# Patient Record
Sex: MALE | Race: WHITE | Employment: UNEMPLOYED | URBAN - METROPOLITAN AREA
[De-identification: names, ages, dates, MRNs, and addresses within clinical notes are randomized per-mention and may not be internally consistent; named-entity substitution may affect disease eponyms.]

---

## 2020-09-13 ENCOUNTER — HOSPITAL ENCOUNTER (EMERGENCY)
Age: 35
Discharge: HOME | End: 2020-09-13
Payer: MEDICARE

## 2020-09-13 VITALS
HEIGHT: 62 IN | SYSTOLIC BLOOD PRESSURE: 125 MMHG | BODY MASS INDEX: 22.08 KG/M2 | DIASTOLIC BLOOD PRESSURE: 84 MMHG | HEART RATE: 82 BPM | TEMPERATURE: 97.5 F | RESPIRATION RATE: 16 BRPM | OXYGEN SATURATION: 100 % | WEIGHT: 120 LBS

## 2020-09-13 PROCEDURE — 96372 THER/PROPH/DIAG INJ SC/IM: CPT

## 2020-09-13 PROCEDURE — 99284 EMERGENCY DEPT VISIT MOD MDM: CPT

## 2020-09-13 PROCEDURE — 6360000002 HC RX W HCPCS: Performed by: PHYSICIAN ASSISTANT

## 2020-09-13 PROCEDURE — 99283 EMERGENCY DEPT VISIT LOW MDM: CPT

## 2020-09-13 RX ORDER — TRIAMCINOLONE ACETONIDE 40 MG/ML
40 INJECTION, SUSPENSION INTRA-ARTICULAR; INTRAMUSCULAR ONCE
Status: COMPLETED | OUTPATIENT
Start: 2020-09-13 | End: 2020-09-13

## 2020-09-13 RX ORDER — TRIAMCINOLONE ACETONIDE 5 MG/G
CREAM TOPICAL
Qty: 45 G | Refills: 0 | Status: SHIPPED | OUTPATIENT
Start: 2020-09-13 | End: 2020-09-13 | Stop reason: SDUPTHER

## 2020-09-13 RX ORDER — TRIAMCINOLONE ACETONIDE 5 MG/G
CREAM TOPICAL
Qty: 45 G | Refills: 0 | Status: SHIPPED | OUTPATIENT
Start: 2020-09-13 | End: 2020-09-20

## 2020-09-13 RX ADMIN — TRIAMCINOLONE ACETONIDE 40 MG: 40 INJECTION, SUSPENSION INTRA-ARTICULAR; INTRAMUSCULAR at 19:36

## 2020-09-13 NOTE — ED PROVIDER NOTES
[facility]      Department of Emergency Medicine   ED  Provider Note  Admit Date/RoomTime: 9/13/2020  6:37 PM  ED Room: 04/04  Chief Complaint   Rash (pk legs and left arm )    History of Present Illness   Source of history provided by:  patient. History/Exam Limitations: communication barrier Language and  service used. George Baez is a 30 y.o. old male with has a past medical history of: History reviewed. No pertinent past medical history. presents to the emergency department by private vehicle, for complaint of sudden onset red, raised, itchy and weeping area on lower legs and small patches on volar forearms which began 2 day(s) prior to arrival.  The symptoms were caused by unknown cause. Since onset the symptoms have been persistent. Prior history of similar episodes: No.   His symptoms are associated with very bad itching and relieved by nothing. Patient reports that he did try some over-the-counter Benadryl ointment He denies any difficulty breathing, difficulty swallowing, wheezing, throat tightness, hoarseness, stridor, facial swelling, lip swelling or tongue swelling. Pt reports started after mowing his grass on Friday. ROS    Pertinent positives and negatives are stated within HPI, all other systems reviewed and are negative. History reviewed. No pertinent surgical history. Social History:  reports that he has never smoked. He has never used smokeless tobacco. He reports current alcohol use. He reports that he does not use drugs. Family History: family history is not on file.    Allergies: Demerol hcl [meperidine]    Physical Exam           ED Triage Vitals   BP Temp Temp Source Pulse Resp SpO2 Height Weight   09/13/20 1836 09/13/20 1833 09/13/20 1833 09/13/20 1833 09/13/20 1833 09/13/20 1833 09/13/20 1836 09/13/20 1836   125/84 97.5 °F (36.4 °C) Infrared 82 16 100 % 5' 2\" (1.575 m) 120 lb (54.4 kg)     Oxygen Saturation Interpretation: Normal.    Constitutional:  Alert, development consistent with age. HEENT:  NC/NT. Airway patent. Eyes:  PERRL, EOMI, no discharge. Ears:  TMs without perforation, injection, or bulging. External canals clear without exudate. Mouth:  Mucous membranes moist without lesions, tongue and gums normal.  Throat:  Pharynx without injection, exudate, or tonsillar hypertrophy. Airway patient. Neck:  Supple. No lymphadenopathy. Respiratory:  Clear to auscultation and breath sounds equal.  CV:  Regular rate and rhythm. GI:  Abdomen Soft, nontender, +BS. Integument:  Skin turgor: Normal.              Diffuse patchy vesicular rash, singles and dense clusters noted, blanches, non tender and no erythema or swelling noted. Neurological:  Orientation age-appropriate unless noted elseware. Motor functions intact. Lab / Imaging Results   (All laboratory and radiology results have been personally reviewed by myself)  Labs:  No results found for this visit on 09/13/20. Imaging: All Radiology results interpreted by Radiologist unless otherwise noted. No orders to display       ED Course / Medical Decision Making     Medications   triamcinolone acetonide (KENALOG-40) injection 40 mg (has no administration in time range)        Consults:   None    Procedures:   none    MDM:   Pt presents with itchy vesicular rash on his lower legs. Started after mowing his grass couple days ago. Rash is consistent in appearance with contact dermatitis most likely poison ivy. Kenalog will be administered in department and home with a steroid cream.  Patient is Mongolian-speaking and  service was used. Patient expressed understanding with the plan and the use of medications. Counseling: The emergency provider has spoken with the patient and spouse/SO and discussed todays results, in addition to providing specific details for the plan of care and counseling regarding the diagnosis and prognosis.   Questions are answered at this time and they are agreeable with the plan. Assessment      1. Poison ivy dermatitis Stable     Plan   Discharge to home  Patient condition is good    New Medications     New Prescriptions    TRIAMCINOLONE (ARISTOCORT) 0.5 % CREAM    Apply topically 3 times daily. Do not use past 5 days. Electronically signed by Abbey Rivera PA-C   DD: 9/13/20  **This report was transcribed using voice recognition software. Every effort was made to ensure accuracy; however, inadvertent computerized transcription errors may be present.   END OF ED PROVIDER NOTE       Abbey Rivera PA-C  09/13/20 5528

## 2021-04-26 ENCOUNTER — HOSPITAL ENCOUNTER (EMERGENCY)
Age: 36
Discharge: HOME | End: 2021-04-26
Payer: MEDICARE

## 2021-04-26 VITALS
TEMPERATURE: 97.3 F | BODY MASS INDEX: 22.08 KG/M2 | HEIGHT: 62 IN | HEART RATE: 92 BPM | OXYGEN SATURATION: 100 % | DIASTOLIC BLOOD PRESSURE: 76 MMHG | SYSTOLIC BLOOD PRESSURE: 121 MMHG | WEIGHT: 120 LBS | RESPIRATION RATE: 16 BRPM

## 2021-04-26 DIAGNOSIS — S01.81XA FACIAL LACERATION, INITIAL ENCOUNTER: Primary | ICD-10-CM

## 2021-04-26 PROCEDURE — 99282 EMERGENCY DEPT VISIT SF MDM: CPT

## 2021-04-26 PROCEDURE — 90471 IMMUNIZATION ADMIN: CPT | Performed by: PHYSICIAN ASSISTANT

## 2021-04-26 PROCEDURE — 6360000002 HC RX W HCPCS: Performed by: PHYSICIAN ASSISTANT

## 2021-04-26 PROCEDURE — 90715 TDAP VACCINE 7 YRS/> IM: CPT | Performed by: PHYSICIAN ASSISTANT

## 2021-04-26 PROCEDURE — 12011 RPR F/E/E/N/L/M 2.5 CM/<: CPT

## 2021-04-26 RX ADMIN — TETANUS TOXOID, REDUCED DIPHTHERIA TOXOID AND ACELLULAR PERTUSSIS VACCINE, ADSORBED 0.5 ML: 5; 2.5; 8; 8; 2.5 SUSPENSION INTRAMUSCULAR at 17:58

## 2021-04-26 ASSESSMENT — ENCOUNTER SYMPTOMS
CHEST TIGHTNESS: 0
COLOR CHANGE: 0
COUGH: 0
SHORTNESS OF BREATH: 0

## 2021-04-26 NOTE — ED PROVIDER NOTES
[facility]        Department of Emergency Medicine   ED  Provider Note  Admit Date/RoomTime: 4/26/2021  5:08 PM  ED Room: 29/29  HPI:  4/26/21, Time: 5:26 PM EDT      Patient is a 20-year-old male presenting to the emergency department with a laceration to his forehead. Patient states he is a  and he was working on a car when he was pulling on a tube and the tube came off and hit him in the head causing him to cut his forehead. He states he did not lose consciousness. He states it is not painful. He does not take blood thinners. He does not know of any tetanus in the last 10 years. He denies any headache, dizziness, vision changes, numbness/tingling/weakness. The history is provided by the patient. A  was used. REVIEW OF SYSTEMS:  Review of Systems   Constitutional: Negative for activity change, chills, fatigue and fever. Respiratory: Negative for cough, chest tightness and shortness of breath. Cardiovascular: Negative for chest pain, palpitations and leg swelling. Musculoskeletal: Negative for arthralgias, myalgias, neck pain and neck stiffness. Skin: Positive for wound. Negative for color change, pallor and rash. Neurological: Negative for dizziness, weakness, light-headedness and headaches. Hematological: Does not bruise/bleed easily. Psychiatric/Behavioral: Negative for agitation, behavioral problems and confusion. Pertinent positives and negatives are stated within HPI, all other systems reviewed and are negative.      --------------------------------------------- PAST HISTORY ---------------------------------------------  Past Medical History:  has no past medical history on file. Past Surgical History:  has no past surgical history on file. Social History:  reports that he has never smoked. He has never used smokeless tobacco. He reports current alcohol use. He reports that he does not use drugs.     Family History: family history is not on file. The patients home medications have been reviewed. Allergies: Demerol hcl [meperidine]    -------------------------------------------------- RESULTS -------------------------------------------------  All laboratory and radiology results have been personally reviewed by myself   LABS:  No results found for this visit on 04/26/21. RADIOLOGY:  Interpreted by Radiologist.  No orders to display       ------------------------- NURSING NOTES AND VITALS REVIEWED ---------------------------   The nursing notes within the ED encounter and vital signs as below have been reviewed. /76   Pulse 92   Temp 97.3 °F (36.3 °C)   Resp 16   Ht 5' 2\" (1.575 m)   Wt 120 lb (54.4 kg)   SpO2 100%   BMI 21.95 kg/m²   Oxygen Saturation Interpretation: Normal      ---------------------------------------------------PHYSICAL EXAM--------------------------------------    Physical Exam  Constitutional:       General: He is not in acute distress. Appearance: Normal appearance. He is well-developed. HENT:      Head: Normocephalic and atraumatic. Comments: 1.5 cm Y shaped longitudinal laceration to the midline forehead between the brows. No active bleeding. Mouth/Throat:      Mouth: Mucous membranes are moist.      Pharynx: Oropharynx is clear. Neck:      Musculoskeletal: Normal range of motion and neck supple. No neck rigidity. Cardiovascular:      Rate and Rhythm: Normal rate and regular rhythm. Heart sounds: Normal heart sounds. No murmur. Pulmonary:      Effort: Pulmonary effort is normal. No respiratory distress. Breath sounds: Normal breath sounds. Musculoskeletal: Normal range of motion. General: No swelling, tenderness or deformity. Skin:     General: Skin is warm and dry. Capillary Refill: Capillary refill takes less than 2 seconds. Findings: No erythema. Neurological:      General: No focal deficit present.       Mental Status: He is alert and oriented to person, place, and time. Mental status is at baseline. Coordination: Coordination normal.      Comments: CN III-XII intact. Psychiatric:         Mood and Affect: Mood normal.         Behavior: Behavior normal.         Thought Content: Thought content normal.            ------------------------------ ED COURSE/MEDICAL DECISION MAKING----------------------  Medications   Tetanus-Diphth-Acell Pertussis (BOOSTRIX) injection 0.5 mL (has no administration in time range)         ED COURSE:     No orders to display       Procedures:  Lac Repair    Date/Time: 4/26/2021 5:43 PM  Performed by: Narda Infante PA-C  Authorized by: Narda Infante PA-C     Consent:     Consent obtained:  Verbal    Consent given by:  Patient    Risks discussed:  Infection, pain and poor cosmetic result    Alternatives discussed:  No treatment and referral  Anesthesia (see MAR for exact dosages): Anesthesia method:  Local infiltration    Local anesthetic:  Lidocaine 1% w/o epi  Laceration details:     Location:  Face    Face location:  Forehead    Length (cm):  1  Repair type:     Repair type:  Simple  Pre-procedure details:     Preparation:  Patient was prepped and draped in usual sterile fashion  Exploration:     Hemostasis achieved with:  Direct pressure    Wound exploration: wound explored through full range of motion      Contaminated: no    Treatment:     Area cleansed with:  Hibiclens and saline    Amount of cleaning:  Standard    Irrigation solution:  Sterile saline    Irrigation method:  Syringe    Visualized foreign bodies/material removed: no    Skin repair:     Repair method:  Sutures    Suture size:  6-0    Suture material:  Prolene    Suture technique:  Simple interrupted    Number of sutures:  4  Approximation:     Approximation:  Close  Post-procedure details:     Dressing:  Open (no dressing)    Patient tolerance of procedure:   Tolerated well, no immediate complications         Medical Decision Making: MDM   18-year-old male presenting to the ED with a laceration to his forehead from a tube while working on a car. He has no neurological deficits did not lose consciousness. Patient is updated on his tetanus while in the ED. the laceration was repaired with 4 simple sutures. Patient tolerated without difficulty. The  was used to give the patient proper wound care instructions and he is encouraged to have them removed in 5 days. He is discharged home in good condition. Counseling: The emergency provider has spoken with the patient and discussed todays results, in addition to providing specific details for the plan of care and counseling regarding the diagnosis and prognosis. Questions are answered at this time and they are agreeable with the plan.      --------------------------------- IMPRESSION AND DISPOSITION ---------------------------------    IMPRESSION  1. Facial laceration, initial encounter        DISPOSITION  Disposition: Discharge to home  Patient condition is good      Electronically signed by Ying Arredondo PA-C   DD: 4/26/21  **This report was transcribed using voice recognition software. Every effort was made to ensure accuracy; however, inadvertent computerized transcription errors may be present.   END OF ED PROVIDER NOTE         Ying Arredondo PA-C  04/26/21 3513

## 2021-05-31 ENCOUNTER — APPOINTMENT (OUTPATIENT)
Dept: GENERAL RADIOLOGY | Age: 36
DRG: 313 | End: 2021-05-31
Payer: MEDICARE

## 2021-05-31 ENCOUNTER — APPOINTMENT (OUTPATIENT)
Dept: CT IMAGING | Age: 36
DRG: 313 | End: 2021-05-31
Payer: MEDICARE

## 2021-05-31 ENCOUNTER — HOSPITAL ENCOUNTER (INPATIENT)
Age: 36
LOS: 2 days | Discharge: HOME HEALTH | DRG: 313 | End: 2021-06-02
Attending: EMERGENCY MEDICINE | Admitting: ORTHOPAEDIC SURGERY
Payer: MEDICARE

## 2021-05-31 DIAGNOSIS — G89.18 POST-OP PAIN: ICD-10-CM

## 2021-05-31 DIAGNOSIS — S82.891A CLOSED FRACTURE OF RIGHT ANKLE, INITIAL ENCOUNTER: Primary | ICD-10-CM

## 2021-05-31 DIAGNOSIS — M89.8X6 PAIN OF RIGHT TIBIA: ICD-10-CM

## 2021-05-31 PROBLEM — S82.251A CLOSED DISPLACED COMMINUTED FRACTURE OF SHAFT OF RIGHT TIBIA: Status: ACTIVE | Noted: 2021-05-31

## 2021-05-31 PROCEDURE — 73590 X-RAY EXAM OF LOWER LEG: CPT

## 2021-05-31 PROCEDURE — 73700 CT LOWER EXTREMITY W/O DYE: CPT

## 2021-05-31 PROCEDURE — 96374 THER/PROPH/DIAG INJ IV PUSH: CPT

## 2021-05-31 PROCEDURE — 71045 X-RAY EXAM CHEST 1 VIEW: CPT

## 2021-05-31 PROCEDURE — 6360000002 HC RX W HCPCS: Performed by: EMERGENCY MEDICINE

## 2021-05-31 PROCEDURE — 96376 TX/PRO/DX INJ SAME DRUG ADON: CPT

## 2021-05-31 PROCEDURE — 6360000002 HC RX W HCPCS

## 2021-05-31 PROCEDURE — G0378 HOSPITAL OBSERVATION PER HR: HCPCS

## 2021-05-31 PROCEDURE — 96375 TX/PRO/DX INJ NEW DRUG ADDON: CPT

## 2021-05-31 PROCEDURE — 73610 X-RAY EXAM OF ANKLE: CPT

## 2021-05-31 PROCEDURE — 73600 X-RAY EXAM OF ANKLE: CPT

## 2021-05-31 PROCEDURE — 6360000002 HC RX W HCPCS: Performed by: NURSE PRACTITIONER

## 2021-05-31 PROCEDURE — 2700000000 HC OXYGEN THERAPY PER DAY

## 2021-05-31 PROCEDURE — 99284 EMERGENCY DEPT VISIT MOD MDM: CPT

## 2021-05-31 PROCEDURE — 27810 TREATMENT OF ANKLE FRACTURE: CPT

## 2021-05-31 PROCEDURE — 2500000003 HC RX 250 WO HCPCS

## 2021-05-31 PROCEDURE — 1200000000 HC SEMI PRIVATE

## 2021-05-31 RX ORDER — FENTANYL CITRATE 50 UG/ML
25 INJECTION, SOLUTION INTRAMUSCULAR; INTRAVENOUS ONCE
Status: COMPLETED | OUTPATIENT
Start: 2021-05-31 | End: 2021-05-31

## 2021-05-31 RX ORDER — PROPOFOL 10 MG/ML
INJECTION, EMULSION INTRAVENOUS
Status: COMPLETED
Start: 2021-05-31 | End: 2021-05-31

## 2021-05-31 RX ORDER — KETAMINE HYDROCHLORIDE 10 MG/ML
32.5 INJECTION, SOLUTION INTRAMUSCULAR; INTRAVENOUS ONCE
Status: COMPLETED | OUTPATIENT
Start: 2021-05-31 | End: 2021-05-31

## 2021-05-31 RX ORDER — PROPOFOL 10 MG/ML
32.5 INJECTION, EMULSION INTRAVENOUS ONCE
Status: COMPLETED | OUTPATIENT
Start: 2021-05-31 | End: 2021-05-31

## 2021-05-31 RX ORDER — KETAMINE HYDROCHLORIDE 10 MG/ML
INJECTION, SOLUTION INTRAMUSCULAR; INTRAVENOUS
Status: COMPLETED
Start: 2021-05-31 | End: 2021-05-31

## 2021-05-31 RX ADMIN — FENTANYL CITRATE 25 MCG: 0.05 INJECTION, SOLUTION INTRAMUSCULAR; INTRAVENOUS at 22:47

## 2021-05-31 RX ADMIN — PROPOFOL 33 MG: 10 INJECTION, EMULSION INTRAVENOUS at 22:47

## 2021-05-31 RX ADMIN — KETAMINE HYDROCHLORIDE 32.5 MG: 10 INJECTION, SOLUTION INTRAMUSCULAR; INTRAVENOUS at 22:47

## 2021-05-31 RX ADMIN — FENTANYL CITRATE 25 MCG: 50 INJECTION, SOLUTION INTRAMUSCULAR; INTRAVENOUS at 21:33

## 2021-05-31 ASSESSMENT — ENCOUNTER SYMPTOMS
COUGH: 0
RHINORRHEA: 0
BLOOD IN STOOL: 0
SHORTNESS OF BREATH: 0
DIARRHEA: 0
VOMITING: 0
BACK PAIN: 0
NAUSEA: 0
CONSTIPATION: 0
ABDOMINAL PAIN: 0
SORE THROAT: 0

## 2021-05-31 ASSESSMENT — PAIN SCALES - GENERAL
PAINLEVEL_OUTOF10: 10

## 2021-05-31 ASSESSMENT — PAIN DESCRIPTION - DESCRIPTORS: DESCRIPTORS: ACHING

## 2021-05-31 ASSESSMENT — PAIN DESCRIPTION - PAIN TYPE: TYPE: ACUTE PAIN

## 2021-05-31 ASSESSMENT — PAIN DESCRIPTION - LOCATION: LOCATION: ANKLE

## 2021-06-01 ENCOUNTER — ANESTHESIA (OUTPATIENT)
Dept: OPERATING ROOM | Age: 36
DRG: 313 | End: 2021-06-01
Payer: MEDICARE

## 2021-06-01 ENCOUNTER — ANESTHESIA EVENT (OUTPATIENT)
Dept: OPERATING ROOM | Age: 36
DRG: 313 | End: 2021-06-01
Payer: MEDICARE

## 2021-06-01 ENCOUNTER — APPOINTMENT (OUTPATIENT)
Dept: GENERAL RADIOLOGY | Age: 36
DRG: 313 | End: 2021-06-01
Payer: MEDICARE

## 2021-06-01 VITALS — TEMPERATURE: 97.5 F | SYSTOLIC BLOOD PRESSURE: 161 MMHG | OXYGEN SATURATION: 100 % | DIASTOLIC BLOOD PRESSURE: 96 MMHG

## 2021-06-01 LAB
ABO/RH: NORMAL
ALBUMIN SERPL-MCNC: 4 G/DL (ref 3.5–5.2)
ALBUMIN SERPL-MCNC: 4.1 G/DL (ref 3.5–5.2)
ALP BLD-CCNC: 64 U/L (ref 40–129)
ALP BLD-CCNC: 66 U/L (ref 40–129)
ALT SERPL-CCNC: 13 U/L (ref 0–40)
ALT SERPL-CCNC: 13 U/L (ref 0–40)
ANION GAP SERPL CALCULATED.3IONS-SCNC: 11 MMOL/L (ref 7–16)
ANION GAP SERPL CALCULATED.3IONS-SCNC: 13 MMOL/L (ref 7–16)
ANTIBODY SCREEN: NORMAL
APTT: 24.6 SEC (ref 24.5–35.1)
AST SERPL-CCNC: 29 U/L (ref 0–39)
AST SERPL-CCNC: 31 U/L (ref 0–39)
BASOPHILS ABSOLUTE: 0.05 E9/L (ref 0–0.2)
BASOPHILS ABSOLUTE: 0.06 E9/L (ref 0–0.2)
BASOPHILS RELATIVE PERCENT: 0.4 % (ref 0–2)
BASOPHILS RELATIVE PERCENT: 0.5 % (ref 0–2)
BILIRUB SERPL-MCNC: 0.4 MG/DL (ref 0–1.2)
BILIRUB SERPL-MCNC: 0.5 MG/DL (ref 0–1.2)
BUN BLDV-MCNC: 10 MG/DL (ref 6–20)
BUN BLDV-MCNC: 9 MG/DL (ref 6–20)
CALCIUM SERPL-MCNC: 9.6 MG/DL (ref 8.6–10.2)
CALCIUM SERPL-MCNC: 9.8 MG/DL (ref 8.6–10.2)
CHLORIDE BLD-SCNC: 101 MMOL/L (ref 98–107)
CHLORIDE BLD-SCNC: 98 MMOL/L (ref 98–107)
CO2: 28 MMOL/L (ref 22–29)
CO2: 30 MMOL/L (ref 22–29)
CREAT SERPL-MCNC: 0.6 MG/DL (ref 0.7–1.2)
CREAT SERPL-MCNC: 0.7 MG/DL (ref 0.7–1.2)
EOSINOPHILS ABSOLUTE: 0 E9/L (ref 0.05–0.5)
EOSINOPHILS ABSOLUTE: 0 E9/L (ref 0.05–0.5)
EOSINOPHILS RELATIVE PERCENT: 0 % (ref 0–6)
EOSINOPHILS RELATIVE PERCENT: 0 % (ref 0–6)
GFR AFRICAN AMERICAN: >60
GFR AFRICAN AMERICAN: >60
GFR NON-AFRICAN AMERICAN: >60 ML/MIN/1.73
GFR NON-AFRICAN AMERICAN: >60 ML/MIN/1.73
GLUCOSE BLD-MCNC: 116 MG/DL (ref 74–99)
GLUCOSE BLD-MCNC: 121 MG/DL (ref 74–99)
HCT VFR BLD CALC: 44.8 % (ref 37–54)
HCT VFR BLD CALC: 45 % (ref 37–54)
HEMOGLOBIN: 14.6 G/DL (ref 12.5–16.5)
HEMOGLOBIN: 14.8 G/DL (ref 12.5–16.5)
IMMATURE GRANULOCYTES #: 0.04 E9/L
IMMATURE GRANULOCYTES #: 0.06 E9/L
IMMATURE GRANULOCYTES %: 0.3 % (ref 0–5)
IMMATURE GRANULOCYTES %: 0.5 % (ref 0–5)
INR BLD: 1
LYMPHOCYTES ABSOLUTE: 1.37 E9/L (ref 1.5–4)
LYMPHOCYTES ABSOLUTE: 1.46 E9/L (ref 1.5–4)
LYMPHOCYTES RELATIVE PERCENT: 11.3 % (ref 20–42)
LYMPHOCYTES RELATIVE PERCENT: 12 % (ref 20–42)
MAGNESIUM: 1.6 MG/DL (ref 1.6–2.6)
MCH RBC QN AUTO: 29.9 PG (ref 26–35)
MCH RBC QN AUTO: 30.4 PG (ref 26–35)
MCHC RBC AUTO-ENTMCNC: 32.4 % (ref 32–34.5)
MCHC RBC AUTO-ENTMCNC: 33 % (ref 32–34.5)
MCV RBC AUTO: 92 FL (ref 80–99.9)
MCV RBC AUTO: 92 FL (ref 80–99.9)
MONOCYTES ABSOLUTE: 0.73 E9/L (ref 0.1–0.95)
MONOCYTES ABSOLUTE: 0.77 E9/L (ref 0.1–0.95)
MONOCYTES RELATIVE PERCENT: 6 % (ref 2–12)
MONOCYTES RELATIVE PERCENT: 6.3 % (ref 2–12)
NEUTROPHILS ABSOLUTE: 9.83 E9/L (ref 1.8–7.3)
NEUTROPHILS ABSOLUTE: 9.97 E9/L (ref 1.8–7.3)
NEUTROPHILS RELATIVE PERCENT: 80.8 % (ref 43–80)
NEUTROPHILS RELATIVE PERCENT: 81.9 % (ref 43–80)
PDW BLD-RTO: 13.3 FL (ref 11.5–15)
PDW BLD-RTO: 13.3 FL (ref 11.5–15)
PLATELET # BLD: 263 E9/L (ref 130–450)
PLATELET # BLD: 265 E9/L (ref 130–450)
PMV BLD AUTO: 11.9 FL (ref 7–12)
PMV BLD AUTO: 12.1 FL (ref 7–12)
POTASSIUM REFLEX MAGNESIUM: 3.5 MMOL/L (ref 3.5–5)
POTASSIUM SERPL-SCNC: 3.6 MMOL/L (ref 3.5–5)
PROTHROMBIN TIME: 11.3 SEC (ref 9.3–12.4)
RBC # BLD: 4.87 E12/L (ref 3.8–5.8)
RBC # BLD: 4.89 E12/L (ref 3.8–5.8)
SODIUM BLD-SCNC: 139 MMOL/L (ref 132–146)
SODIUM BLD-SCNC: 142 MMOL/L (ref 132–146)
TOTAL PROTEIN: 7 G/DL (ref 6.4–8.3)
TOTAL PROTEIN: 7.1 G/DL (ref 6.4–8.3)
WBC # BLD: 12.2 E9/L (ref 4.5–11.5)
WBC # BLD: 12.2 E9/L (ref 4.5–11.5)

## 2021-06-01 PROCEDURE — 1200000000 HC SEMI PRIVATE

## 2021-06-01 PROCEDURE — 0QSG04Z REPOSITION RIGHT TIBIA WITH INTERNAL FIXATION DEVICE, OPEN APPROACH: ICD-10-PCS | Performed by: ORTHOPAEDIC SURGERY

## 2021-06-01 PROCEDURE — 3600000005 HC SURGERY LEVEL 5 BASE: Performed by: ORTHOPAEDIC SURGERY

## 2021-06-01 PROCEDURE — 85610 PROTHROMBIN TIME: CPT

## 2021-06-01 PROCEDURE — 6360000002 HC RX W HCPCS: Performed by: STUDENT IN AN ORGANIZED HEALTH CARE EDUCATION/TRAINING PROGRAM

## 2021-06-01 PROCEDURE — 6370000000 HC RX 637 (ALT 250 FOR IP): Performed by: STUDENT IN AN ORGANIZED HEALTH CARE EDUCATION/TRAINING PROGRAM

## 2021-06-01 PROCEDURE — 3700000000 HC ANESTHESIA ATTENDED CARE: Performed by: ORTHOPAEDIC SURGERY

## 2021-06-01 PROCEDURE — 85025 COMPLETE CBC W/AUTO DIFF WBC: CPT

## 2021-06-01 PROCEDURE — 80053 COMPREHEN METABOLIC PANEL: CPT

## 2021-06-01 PROCEDURE — 2580000003 HC RX 258: Performed by: NURSE ANESTHETIST, CERTIFIED REGISTERED

## 2021-06-01 PROCEDURE — 7100000001 HC PACU RECOVERY - ADDTL 15 MIN: Performed by: ORTHOPAEDIC SURGERY

## 2021-06-01 PROCEDURE — 2500000003 HC RX 250 WO HCPCS: Performed by: NURSE ANESTHETIST, CERTIFIED REGISTERED

## 2021-06-01 PROCEDURE — 2700000000 HC OXYGEN THERAPY PER DAY

## 2021-06-01 PROCEDURE — 86901 BLOOD TYPING SEROLOGIC RH(D): CPT

## 2021-06-01 PROCEDURE — 6370000000 HC RX 637 (ALT 250 FOR IP): Performed by: ORTHOPAEDIC SURGERY

## 2021-06-01 PROCEDURE — 0QSG06Z REPOSITION RIGHT TIBIA WITH INTRAMEDULLARY INTERNAL FIXATION DEVICE, OPEN APPROACH: ICD-10-PCS | Performed by: ORTHOPAEDIC SURGERY

## 2021-06-01 PROCEDURE — G0378 HOSPITAL OBSERVATION PER HR: HCPCS

## 2021-06-01 PROCEDURE — 83735 ASSAY OF MAGNESIUM: CPT

## 2021-06-01 PROCEDURE — 2720000010 HC SURG SUPPLY STERILE: Performed by: ORTHOPAEDIC SURGERY

## 2021-06-01 PROCEDURE — 6360000002 HC RX W HCPCS: Performed by: NURSE ANESTHETIST, CERTIFIED REGISTERED

## 2021-06-01 PROCEDURE — 27827 TREAT LOWER LEG FRACTURE: CPT | Performed by: ORTHOPAEDIC SURGERY

## 2021-06-01 PROCEDURE — 2580000003 HC RX 258: Performed by: ORTHOPAEDIC SURGERY

## 2021-06-01 PROCEDURE — 3600000015 HC SURGERY LEVEL 5 ADDTL 15MIN: Performed by: ORTHOPAEDIC SURGERY

## 2021-06-01 PROCEDURE — 73560 X-RAY EXAM OF KNEE 1 OR 2: CPT

## 2021-06-01 PROCEDURE — 85730 THROMBOPLASTIN TIME PARTIAL: CPT

## 2021-06-01 PROCEDURE — 27759 TREATMENT OF TIBIA FRACTURE: CPT | Performed by: ORTHOPAEDIC SURGERY

## 2021-06-01 PROCEDURE — 7100000000 HC PACU RECOVERY - FIRST 15 MIN: Performed by: ORTHOPAEDIC SURGERY

## 2021-06-01 PROCEDURE — 3700000001 HC ADD 15 MINUTES (ANESTHESIA): Performed by: ORTHOPAEDIC SURGERY

## 2021-06-01 PROCEDURE — 73590 X-RAY EXAM OF LOWER LEG: CPT

## 2021-06-01 PROCEDURE — C1713 ANCHOR/SCREW BN/BN,TIS/BN: HCPCS | Performed by: ORTHOPAEDIC SURGERY

## 2021-06-01 PROCEDURE — C1769 GUIDE WIRE: HCPCS | Performed by: ORTHOPAEDIC SURGERY

## 2021-06-01 PROCEDURE — 86850 RBC ANTIBODY SCREEN: CPT

## 2021-06-01 PROCEDURE — 86900 BLOOD TYPING SEROLOGIC ABO: CPT

## 2021-06-01 PROCEDURE — 3209999900 FLUORO FOR SURGICAL PROCEDURES

## 2021-06-01 PROCEDURE — 36415 COLL VENOUS BLD VENIPUNCTURE: CPT

## 2021-06-01 PROCEDURE — 2709999900 HC NON-CHARGEABLE SUPPLY: Performed by: ORTHOPAEDIC SURGERY

## 2021-06-01 PROCEDURE — 2500000003 HC RX 250 WO HCPCS: Performed by: STUDENT IN AN ORGANIZED HEALTH CARE EDUCATION/TRAINING PROGRAM

## 2021-06-01 DEVICE — SCREW BNE L28MM DIA5MM LT GRN TI ST LOK FULL THRD T25: Type: IMPLANTABLE DEVICE | Site: TIBIA | Status: FUNCTIONAL

## 2021-06-01 DEVICE — SCREW LCK 5X32MM W/T25 STRDRV F/IM NAIL STRL: Type: IMPLANTABLE DEVICE | Site: TIBIA | Status: FUNCTIONAL

## 2021-06-01 DEVICE — SCREW BNE L40MM DIA5MM TIB LT GRN TI ST CANN LOK FULL THRD: Type: IMPLANTABLE DEVICE | Site: TIBIA | Status: FUNCTIONAL

## 2021-06-01 DEVICE — IMPLANTABLE DEVICE: Type: IMPLANTABLE DEVICE | Site: TIBIA | Status: FUNCTIONAL

## 2021-06-01 DEVICE — SCREW BNE L38MM DIA5MM TIB LT GRN TI ST CANN LOK FULL THRD: Type: IMPLANTABLE DEVICE | Site: TIBIA | Status: FUNCTIONAL

## 2021-06-01 DEVICE — SCREW BNE L36MM DIA5MM TIB LT GRN TI ST CANN LOK FULL THRD: Type: IMPLANTABLE DEVICE | Site: TIBIA | Status: FUNCTIONAL

## 2021-06-01 DEVICE — SCREW BNE LCK 5X30 MM W/ T25 STARDRV FOR IM NAIL TI STRL: Type: IMPLANTABLE DEVICE | Site: TIBIA | Status: FUNCTIONAL

## 2021-06-01 DEVICE — SCREW BNE L42MM DIA4MM CANC TI SELF DRL ST CANN LOK SHT: Type: IMPLANTABLE DEVICE | Site: TIBIA | Status: FUNCTIONAL

## 2021-06-01 DEVICE — SCREW BNE SD SHT THRD 4X38 MM ST CANN HEX SOCKET TI NS LCP: Type: IMPLANTABLE DEVICE | Site: TIBIA | Status: FUNCTIONAL

## 2021-06-01 RX ORDER — DEXAMETHASONE SODIUM PHOSPHATE 10 MG/ML
INJECTION INTRAMUSCULAR; INTRAVENOUS PRN
Status: DISCONTINUED | OUTPATIENT
Start: 2021-06-01 | End: 2021-06-01 | Stop reason: SDUPTHER

## 2021-06-01 RX ORDER — SODIUM CHLORIDE 0.9 % (FLUSH) 0.9 %
5-40 SYRINGE (ML) INJECTION PRN
Status: DISCONTINUED | OUTPATIENT
Start: 2021-06-01 | End: 2021-06-02 | Stop reason: HOSPADM

## 2021-06-01 RX ORDER — SODIUM CHLORIDE 9 MG/ML
INJECTION, SOLUTION INTRAVENOUS CONTINUOUS
Status: DISCONTINUED | OUTPATIENT
Start: 2021-06-01 | End: 2021-06-02 | Stop reason: HOSPADM

## 2021-06-01 RX ORDER — HYDROMORPHONE HCL 110MG/55ML
PATIENT CONTROLLED ANALGESIA SYRINGE INTRAVENOUS PRN
Status: DISCONTINUED | OUTPATIENT
Start: 2021-06-01 | End: 2021-06-01 | Stop reason: SDUPTHER

## 2021-06-01 RX ORDER — ONDANSETRON 2 MG/ML
INJECTION INTRAMUSCULAR; INTRAVENOUS PRN
Status: DISCONTINUED | OUTPATIENT
Start: 2021-06-01 | End: 2021-06-01 | Stop reason: SDUPTHER

## 2021-06-01 RX ORDER — ACETAMINOPHEN 325 MG/1
650 TABLET ORAL EVERY 4 HOURS PRN
Status: DISCONTINUED | OUTPATIENT
Start: 2021-06-01 | End: 2021-06-02 | Stop reason: HOSPADM

## 2021-06-01 RX ORDER — OXYCODONE HYDROCHLORIDE 5 MG/1
5 TABLET ORAL EVERY 4 HOURS PRN
Status: DISCONTINUED | OUTPATIENT
Start: 2021-06-01 | End: 2021-06-02 | Stop reason: HOSPADM

## 2021-06-01 RX ORDER — MIDAZOLAM HYDROCHLORIDE 1 MG/ML
INJECTION INTRAMUSCULAR; INTRAVENOUS PRN
Status: DISCONTINUED | OUTPATIENT
Start: 2021-06-01 | End: 2021-06-01 | Stop reason: SDUPTHER

## 2021-06-01 RX ORDER — OXYCODONE HYDROCHLORIDE 5 MG/1
5 TABLET ORAL EVERY 6 HOURS PRN
Qty: 28 TABLET | Refills: 0 | Status: SHIPPED | OUTPATIENT
Start: 2021-06-01 | End: 2021-06-08

## 2021-06-01 RX ORDER — PROMETHAZINE HYDROCHLORIDE 25 MG/1
12.5 TABLET ORAL EVERY 6 HOURS PRN
Status: DISCONTINUED | OUTPATIENT
Start: 2021-06-01 | End: 2021-06-02 | Stop reason: HOSPADM

## 2021-06-01 RX ORDER — MORPHINE SULFATE 2 MG/ML
2 INJECTION, SOLUTION INTRAMUSCULAR; INTRAVENOUS
Status: DISCONTINUED | OUTPATIENT
Start: 2021-06-01 | End: 2021-06-02 | Stop reason: HOSPADM

## 2021-06-01 RX ORDER — NEOSTIGMINE METHYLSULFATE 1 MG/ML
INJECTION, SOLUTION INTRAVENOUS PRN
Status: DISCONTINUED | OUTPATIENT
Start: 2021-06-01 | End: 2021-06-01 | Stop reason: SDUPTHER

## 2021-06-01 RX ORDER — ONDANSETRON 2 MG/ML
4 INJECTION INTRAMUSCULAR; INTRAVENOUS EVERY 6 HOURS PRN
Status: DISCONTINUED | OUTPATIENT
Start: 2021-06-01 | End: 2021-06-02 | Stop reason: HOSPADM

## 2021-06-01 RX ORDER — ASPIRIN 81 MG/1
81 TABLET ORAL 2 TIMES DAILY
Status: DISCONTINUED | OUTPATIENT
Start: 2021-06-01 | End: 2021-06-02 | Stop reason: HOSPADM

## 2021-06-01 RX ORDER — FENTANYL CITRATE 50 UG/ML
INJECTION, SOLUTION INTRAMUSCULAR; INTRAVENOUS PRN
Status: DISCONTINUED | OUTPATIENT
Start: 2021-06-01 | End: 2021-06-01 | Stop reason: SDUPTHER

## 2021-06-01 RX ORDER — LIDOCAINE HYDROCHLORIDE 20 MG/ML
INJECTION, SOLUTION INTRAVENOUS PRN
Status: DISCONTINUED | OUTPATIENT
Start: 2021-06-01 | End: 2021-06-01 | Stop reason: SDUPTHER

## 2021-06-01 RX ORDER — PROPOFOL 10 MG/ML
INJECTION, EMULSION INTRAVENOUS PRN
Status: DISCONTINUED | OUTPATIENT
Start: 2021-06-01 | End: 2021-06-01 | Stop reason: SDUPTHER

## 2021-06-01 RX ORDER — SENNA PLUS 8.6 MG/1
1 TABLET ORAL DAILY PRN
Status: DISCONTINUED | OUTPATIENT
Start: 2021-06-01 | End: 2021-06-02 | Stop reason: HOSPADM

## 2021-06-01 RX ORDER — OXYCODONE HYDROCHLORIDE AND ACETAMINOPHEN 5; 325 MG/1; MG/1
1 TABLET ORAL
Status: ACTIVE | OUTPATIENT
Start: 2021-06-01 | End: 2021-06-01

## 2021-06-01 RX ORDER — HYDRALAZINE HYDROCHLORIDE 20 MG/ML
5 INJECTION INTRAMUSCULAR; INTRAVENOUS EVERY 10 MIN PRN
Status: DISCONTINUED | OUTPATIENT
Start: 2021-06-01 | End: 2021-06-02 | Stop reason: HOSPADM

## 2021-06-01 RX ORDER — GLYCOPYRROLATE 1 MG/5 ML
SYRINGE (ML) INTRAVENOUS PRN
Status: DISCONTINUED | OUTPATIENT
Start: 2021-06-01 | End: 2021-06-01 | Stop reason: SDUPTHER

## 2021-06-01 RX ORDER — ASPIRIN 81 MG/1
81 TABLET ORAL 2 TIMES DAILY
Qty: 30 TABLET | Refills: 1 | Status: SHIPPED | OUTPATIENT
Start: 2021-06-01 | End: 2021-07-31

## 2021-06-01 RX ORDER — LABETALOL HYDROCHLORIDE 5 MG/ML
5 INJECTION, SOLUTION INTRAVENOUS EVERY 10 MIN PRN
Status: DISCONTINUED | OUTPATIENT
Start: 2021-06-01 | End: 2021-06-02 | Stop reason: HOSPADM

## 2021-06-01 RX ORDER — POLYETHYLENE GLYCOL 3350 17 G/17G
17 POWDER, FOR SOLUTION ORAL DAILY PRN
Status: DISCONTINUED | OUTPATIENT
Start: 2021-06-01 | End: 2021-06-02 | Stop reason: HOSPADM

## 2021-06-01 RX ORDER — OXYCODONE HYDROCHLORIDE 10 MG/1
10 TABLET ORAL EVERY 4 HOURS PRN
Status: DISCONTINUED | OUTPATIENT
Start: 2021-06-01 | End: 2021-06-02 | Stop reason: HOSPADM

## 2021-06-01 RX ORDER — SODIUM CHLORIDE 0.9 % (FLUSH) 0.9 %
5-40 SYRINGE (ML) INJECTION EVERY 12 HOURS SCHEDULED
Status: DISCONTINUED | OUTPATIENT
Start: 2021-06-01 | End: 2021-06-02 | Stop reason: HOSPADM

## 2021-06-01 RX ORDER — MORPHINE SULFATE 4 MG/ML
4 INJECTION, SOLUTION INTRAMUSCULAR; INTRAVENOUS
Status: DISCONTINUED | OUTPATIENT
Start: 2021-06-01 | End: 2021-06-02 | Stop reason: HOSPADM

## 2021-06-01 RX ORDER — CEFAZOLIN SODIUM 1 G/3ML
INJECTION, POWDER, FOR SOLUTION INTRAMUSCULAR; INTRAVENOUS PRN
Status: DISCONTINUED | OUTPATIENT
Start: 2021-06-01 | End: 2021-06-01 | Stop reason: SDUPTHER

## 2021-06-01 RX ORDER — SODIUM CHLORIDE 9 MG/ML
25 INJECTION, SOLUTION INTRAVENOUS PRN
Status: DISCONTINUED | OUTPATIENT
Start: 2021-06-01 | End: 2021-06-02 | Stop reason: HOSPADM

## 2021-06-01 RX ORDER — PROMETHAZINE HYDROCHLORIDE 25 MG/ML
6.25 INJECTION, SOLUTION INTRAMUSCULAR; INTRAVENOUS
Status: ACTIVE | OUTPATIENT
Start: 2021-06-01 | End: 2021-06-01

## 2021-06-01 RX ORDER — SODIUM CHLORIDE 9 MG/ML
INJECTION, SOLUTION INTRAVENOUS CONTINUOUS PRN
Status: DISCONTINUED | OUTPATIENT
Start: 2021-06-01 | End: 2021-06-01 | Stop reason: SDUPTHER

## 2021-06-01 RX ORDER — ROCURONIUM BROMIDE 10 MG/ML
INJECTION, SOLUTION INTRAVENOUS PRN
Status: DISCONTINUED | OUTPATIENT
Start: 2021-06-01 | End: 2021-06-01 | Stop reason: SDUPTHER

## 2021-06-01 RX ADMIN — MIDAZOLAM 2 MG: 1 INJECTION INTRAMUSCULAR; INTRAVENOUS at 08:26

## 2021-06-01 RX ADMIN — CEFAZOLIN 2000 MG: 10 INJECTION, POWDER, FOR SOLUTION INTRAVENOUS at 16:54

## 2021-06-01 RX ADMIN — ROCURONIUM BROMIDE 50 MG: 10 INJECTION, SOLUTION INTRAVENOUS at 08:30

## 2021-06-01 RX ADMIN — PROPOFOL 200 MG: 10 INJECTION, EMULSION INTRAVENOUS at 08:30

## 2021-06-01 RX ADMIN — HYDROMORPHONE HYDROCHLORIDE 1 MG: 2 INJECTION, SOLUTION INTRAMUSCULAR; INTRAVENOUS; SUBCUTANEOUS at 09:57

## 2021-06-01 RX ADMIN — Medication 3 MG: at 10:13

## 2021-06-01 RX ADMIN — FENTANYL CITRATE 50 MCG: 50 INJECTION, SOLUTION INTRAMUSCULAR; INTRAVENOUS at 09:08

## 2021-06-01 RX ADMIN — OXYCODONE HYDROCHLORIDE 10 MG: 10 TABLET ORAL at 16:49

## 2021-06-01 RX ADMIN — LIDOCAINE HYDROCHLORIDE 100 MG: 20 INJECTION, SOLUTION INTRAVENOUS at 08:30

## 2021-06-01 RX ADMIN — Medication 0.6 MG: at 10:13

## 2021-06-01 RX ADMIN — HYDROMORPHONE HYDROCHLORIDE 1 MG: 2 INJECTION, SOLUTION INTRAMUSCULAR; INTRAVENOUS; SUBCUTANEOUS at 10:24

## 2021-06-01 RX ADMIN — ACETAMINOPHEN 650 MG: 325 TABLET ORAL at 06:38

## 2021-06-01 RX ADMIN — DEXAMETHASONE SODIUM PHOSPHATE 10 MG: 10 INJECTION INTRAMUSCULAR; INTRAVENOUS at 08:44

## 2021-06-01 RX ADMIN — FENTANYL CITRATE 50 MCG: 50 INJECTION, SOLUTION INTRAMUSCULAR; INTRAVENOUS at 08:54

## 2021-06-01 RX ADMIN — SODIUM CHLORIDE: 9 INJECTION, SOLUTION INTRAVENOUS at 03:11

## 2021-06-01 RX ADMIN — ASPIRIN 81 MG: 81 TABLET, COATED ORAL at 16:50

## 2021-06-01 RX ADMIN — ONDANSETRON HYDROCHLORIDE 4 MG: 2 INJECTION, SOLUTION INTRAMUSCULAR; INTRAVENOUS at 10:11

## 2021-06-01 RX ADMIN — SODIUM CHLORIDE: 9 INJECTION, SOLUTION INTRAVENOUS at 07:55

## 2021-06-01 RX ADMIN — CEFAZOLIN 2000 MG: 1 INJECTION, POWDER, FOR SOLUTION INTRAMUSCULAR; INTRAVENOUS at 08:29

## 2021-06-01 RX ADMIN — FENTANYL CITRATE 100 MCG: 50 INJECTION, SOLUTION INTRAMUSCULAR; INTRAVENOUS at 08:30

## 2021-06-01 RX ADMIN — OXYCODONE HYDROCHLORIDE 10 MG: 10 TABLET ORAL at 23:06

## 2021-06-01 RX ADMIN — OXYCODONE HYDROCHLORIDE 10 MG: 10 TABLET ORAL at 06:38

## 2021-06-01 ASSESSMENT — PULMONARY FUNCTION TESTS
PIF_VALUE: 24
PIF_VALUE: 26
PIF_VALUE: 22
PIF_VALUE: 3
PIF_VALUE: 24
PIF_VALUE: 25
PIF_VALUE: 24
PIF_VALUE: 25
PIF_VALUE: 24
PIF_VALUE: 25
PIF_VALUE: 26
PIF_VALUE: 11
PIF_VALUE: 23
PIF_VALUE: 1
PIF_VALUE: 22
PIF_VALUE: 1
PIF_VALUE: 0
PIF_VALUE: 6
PIF_VALUE: 23
PIF_VALUE: 22
PIF_VALUE: 23
PIF_VALUE: 26
PIF_VALUE: 25
PIF_VALUE: 23
PIF_VALUE: 25
PIF_VALUE: 23
PIF_VALUE: 26
PIF_VALUE: 23
PIF_VALUE: 27
PIF_VALUE: 0
PIF_VALUE: 23
PIF_VALUE: 29
PIF_VALUE: 24
PIF_VALUE: 23
PIF_VALUE: 1
PIF_VALUE: 9
PIF_VALUE: 25
PIF_VALUE: 26
PIF_VALUE: 2
PIF_VALUE: 24
PIF_VALUE: 24
PIF_VALUE: 3
PIF_VALUE: 32
PIF_VALUE: 22
PIF_VALUE: 23
PIF_VALUE: 20
PIF_VALUE: 22
PIF_VALUE: 3
PIF_VALUE: 27
PIF_VALUE: 22
PIF_VALUE: 6
PIF_VALUE: 24
PIF_VALUE: 3
PIF_VALUE: 22
PIF_VALUE: 20
PIF_VALUE: 25
PIF_VALUE: 23
PIF_VALUE: 18
PIF_VALUE: 25
PIF_VALUE: 20
PIF_VALUE: 25
PIF_VALUE: 22
PIF_VALUE: 29
PIF_VALUE: 2
PIF_VALUE: 1
PIF_VALUE: 2
PIF_VALUE: 25
PIF_VALUE: 32
PIF_VALUE: 2
PIF_VALUE: 22
PIF_VALUE: 25
PIF_VALUE: 23
PIF_VALUE: 25
PIF_VALUE: 23
PIF_VALUE: 25
PIF_VALUE: 22
PIF_VALUE: 30
PIF_VALUE: 22
PIF_VALUE: 23
PIF_VALUE: 25
PIF_VALUE: 29
PIF_VALUE: 25
PIF_VALUE: 22
PIF_VALUE: 18
PIF_VALUE: 3
PIF_VALUE: 26
PIF_VALUE: 3
PIF_VALUE: 2
PIF_VALUE: 3
PIF_VALUE: 32
PIF_VALUE: 22
PIF_VALUE: 6
PIF_VALUE: 25
PIF_VALUE: 25
PIF_VALUE: 2
PIF_VALUE: 27
PIF_VALUE: 1
PIF_VALUE: 23
PIF_VALUE: 24
PIF_VALUE: 23
PIF_VALUE: 22
PIF_VALUE: 26
PIF_VALUE: 1
PIF_VALUE: 20
PIF_VALUE: 23
PIF_VALUE: 22
PIF_VALUE: 25
PIF_VALUE: 22
PIF_VALUE: 0
PIF_VALUE: 24
PIF_VALUE: 25
PIF_VALUE: 1
PIF_VALUE: 2
PIF_VALUE: 23
PIF_VALUE: 18
PIF_VALUE: 10
PIF_VALUE: 25
PIF_VALUE: 2
PIF_VALUE: 2
PIF_VALUE: 26
PIF_VALUE: 23
PIF_VALUE: 23
PIF_VALUE: 22
PIF_VALUE: 26
PIF_VALUE: 23
PIF_VALUE: 11
PIF_VALUE: 25
PIF_VALUE: 26
PIF_VALUE: 0
PIF_VALUE: 25
PIF_VALUE: 23
PIF_VALUE: 22

## 2021-06-01 ASSESSMENT — PAIN DESCRIPTION - LOCATION
LOCATION: ANKLE
LOCATION: ANKLE

## 2021-06-01 ASSESSMENT — PAIN SCALES - GENERAL
PAINLEVEL_OUTOF10: 3
PAINLEVEL_OUTOF10: 7
PAINLEVEL_OUTOF10: 0
PAINLEVEL_OUTOF10: 7
PAINLEVEL_OUTOF10: 5
PAINLEVEL_OUTOF10: 7
PAINLEVEL_OUTOF10: 2
PAINLEVEL_OUTOF10: 5

## 2021-06-01 ASSESSMENT — PAIN DESCRIPTION - DESCRIPTORS
DESCRIPTORS: ACHING
DESCRIPTORS: ACHING

## 2021-06-01 ASSESSMENT — PAIN DESCRIPTION - PROGRESSION
CLINICAL_PROGRESSION: GRADUALLY IMPROVING
CLINICAL_PROGRESSION: GRADUALLY IMPROVING

## 2021-06-01 ASSESSMENT — PAIN DESCRIPTION - PAIN TYPE
TYPE: ACUTE PAIN;SURGICAL PAIN
TYPE: ACUTE PAIN;SURGICAL PAIN

## 2021-06-01 ASSESSMENT — PAIN DESCRIPTION - ORIENTATION
ORIENTATION: LEFT
ORIENTATION: RIGHT
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
## 2021-06-01 NOTE — PROGRESS NOTES
Occupational Therapy  Date:2021  Patient Name: Vinnie Bull  MRN: [id number]  : 1985  Room: [address] [place] [address]-A     OT order received and appreciated. Chart reviewed. Hold OT evaluation, pt scheduled for surgery  . Will follow.     Dominic Ribeiro OTR/L #[id number]

## 2021-06-01 NOTE — PROGRESS NOTES
Admitted to pacu 7, arouseable on calling, vss. RLE with dsd/ace wrap cd&i.   STAT xray ordered & called

## 2021-06-01 NOTE — ANESTHESIA PRE PROCEDURE
Department of Anesthesiology  Preprocedure Note       Name:  Milton Denise   Age:  29 y.o.  :  1985                                          MRN:  [id number]         Date:  2021      Surgeon: Keysha Oneal):  Rosy Sellers MD    Procedure: Procedure(s):  TIBIA OPEN REDUCTION INTERNAL FIXATION  - RIGHT    Medications prior to admission:   Prior to Admission medications    Not on File       Current medications:    Current Facility-Administered Medications   Medication Dose Route Frequency Provider Last Rate Last Admin    sodium chloride flush 0.9 % injection 5-40 mL  5-40 mL Intravenous 2 times per day Beatriz Dunnings, DO        sodium chloride flush 0.9 % injection 5-40 mL  5-40 mL Intravenous PRN Beatriz Dunnings, DO        0.9 % sodium chloride infusion  25 mL Intravenous PRN Beatriz Dunnings, DO        promethazine (PHENERGAN) tablet 12.5 mg  12.5 mg Oral Q6H PRN Beatriz Dunnings, DO        Or    ondansetron (ZOFRAN) injection 4 mg  4 mg Intravenous Q6H PRN Beatriz Dunnings, DO        polyethylene glycol (GLYCOLAX) packet 17 g  17 g Oral Daily PRN Beatriz Dunnings, DO        0.9 % sodium chloride infusion   Intravenous Continuous Beatriz Dunnings, DO 75 mL/hr at 21 0311 New Bag at 21 0311    acetaminophen (TYLENOL) tablet 650 mg  650 mg Oral Q4H PRN Beatriz Dunnings, DO   650 mg at 21 5781    oxyCODONE (ROXICODONE) immediate release tablet 5 mg  5 mg Oral Q4H PRN Beatriz Dunnings, DO        Or    oxyCODONE HCl (OXY-IR) immediate release tablet 10 mg  10 mg Oral Q4H PRN Beatriz Dunnings, DO   10 mg at 21 4496    morphine (PF) injection 2 mg  2 mg Intravenous Q2H PRN Beatriz Dunnings, DO        Or    morphine sulfate (PF) injection 4 mg  4 mg Intravenous Q2H PRN Beatriz Dunnings, DO        senna (SENOKOT) tablet 8.6 mg  1 tablet Oral Daily PRN Beatriz Dunnings, DO        ceFAZolin (ANCEF) 1,000 mg in sterile water 10 mL IV syringe  1,000 mg Intravenous See Admin Instructions Shiva Coe DO           Allergies: Allergies   Allergen Reactions    Demerol Hcl [Meperidine] Other (See Comments)     Does not rememebr         Problem List:    Patient Active Problem List   Diagnosis Code    Closed displaced comminuted fracture of shaft of right tibia V70.620O       Past Medical History:  History reviewed. No pertinent past medical history. Past Surgical History:  History reviewed. No pertinent surgical history. Social History:    Social History     Tobacco Use    Smoking status: Never Smoker    Smokeless tobacco: Never Used   Substance Use Topics    Alcohol use: Yes     Comment: socially                                Counseling given: Not Answered      Vital Signs (Current):   Vitals:    05/31/21 2247 05/31/21 2252 05/31/21 2332 06/01/21 0108   BP: (!) 144/98 122/84 121/83 118/79   Pulse: 76 82 72 75   Resp: (!) 33 23 20 22   Temp:   98 °F (36.7 °C) 99 °F (37.2 °C)   TempSrc:    Temporal   SpO2: 97% 96% 99% 99%   Weight:       Height:                                                  BP Readings from Last 3 Encounters:   06/01/21 118/79   04/26/21 121/76   09/13/20 125/84       NPO Status:                                                                                 BMI:   Wt Readings from Last 3 Encounters:   05/31/21 111 lb 8.8 oz (50.6 kg)   04/26/21 120 lb (54.4 kg)   09/13/20 120 lb (54.4 kg)     Body mass index is 20.4 kg/m². CBC: No results found for: WBC, RBC, HGB, HCT, MCV, RDW, PLT    CMP: No results found for: NA, K, CL, CO2, BUN, CREATININE, GFRAA, AGRATIO, LABGLOM, GLUCOSE, PROT, CALCIUM, BILITOT, ALKPHOS, AST, ALT    POC Tests: No results for input(s): POCGLU, POCNA, POCK, POCCL, POCBUN, POCHEMO, POCHCT in the last 72 hours.     Coags: No results found for: PROTIME, INR, APTT    HCG (If Applicable): No results found for: PREGTESTUR, PREGSERUM, HCG, HCGQUANT     ABGs: No results found for: PHART, PO2ART, PIX8HMV, VEW0PYL, BEART, M9HTRKVP     Type & Screen (If Applicable):  No results found for: LABABO, LABRH    Drug/Infectious Status (If Applicable):  No results found for: HIV, HEPCAB    COVID-19 Screening (If Applicable): No results found for: COVID19        Anesthesia Evaluation  Patient summary reviewed and Nursing notes reviewed no history of anesthetic complications:   Airway: Mallampati: II  TM distance: >3 FB   Neck ROM: full  Mouth opening: > = 3 FB Dental:          Pulmonary:Negative Pulmonary ROS breath sounds clear to auscultation                             Cardiovascular:Negative CV ROS  Exercise tolerance: good (>4 METS),             Rate: normal                    Neuro/Psych:   Negative Neuro/Psych ROS              GI/Hepatic/Renal: Neg GI/Hepatic/Renal ROS            Endo/Other: Negative Endo/Other ROS                    Abdominal:           Vascular: negative vascular ROS. Anesthesia Plan      general     ASA 1       Induction: intravenous. MIPS: Postoperative opioids intended, Prophylactic antiemetics administered and Postoperative trial extubation. Anesthetic plan and risks discussed with patient.                       Gio Bella MD   6/1/2021

## 2021-06-01 NOTE — PROGRESS NOTES
Physical Therapy    PT orders received and appreciated. Per chart, pt for OR today. Hold PT evaluation for now. Will evaluate post-op when appropriate.      Prosper Pickett, PT, DPT  [id number]

## 2021-06-01 NOTE — PROGRESS NOTES
Dr. Rudy Montez consulted for medical management and stated, [facility] has no medical problems and takes no medications. If something comes up, let attending know to feel free to reconsult\".
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
## 2021-06-01 NOTE — ANESTHESIA POSTPROCEDURE EVALUATION
Department of Anesthesiology  Postprocedure Note    Patient: Nicholas Etienne  MRN: [id number]  YOB: 1985  Date of evaluation: 6/1/2021  Time:  1:58 PM     Procedure Summary     Date: 06/01/21 Room / Location: [place] [address] / [facility]    Anesthesia Start: 6587 Anesthesia Stop: 3173    Procedure: TIBIA OPEN REDUCTION INTERNAL FIXATION  - RIGHT (Right Leg Lower) Diagnosis: (.)    Surgeons: Melly Castellanos MD Responsible Provider: Isiah Abad MD    Anesthesia Type: general ASA Status: 1          Anesthesia Type: No value filed. Bina Phase I: Bina Score: 8    Bina Phase II:      Last vitals: Reviewed and per EMR flowsheets.        Anesthesia Post Evaluation    Patient location during evaluation: PACU  Patient participation: complete - patient participated  Level of consciousness: awake and alert  Airway patency: patent  Nausea & Vomiting: no nausea and no vomiting  Complications: no  Cardiovascular status: hemodynamically stable  Respiratory status: acceptable  Hydration status: euvolemic
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
## 2021-06-02 VITALS
OXYGEN SATURATION: 98 % | BODY MASS INDEX: 20.53 KG/M2 | RESPIRATION RATE: 18 BRPM | HEART RATE: 74 BPM | WEIGHT: 111.55 LBS | TEMPERATURE: 97.5 F | DIASTOLIC BLOOD PRESSURE: 77 MMHG | HEIGHT: 62 IN | SYSTOLIC BLOOD PRESSURE: 137 MMHG

## 2021-06-02 PROCEDURE — G0378 HOSPITAL OBSERVATION PER HR: HCPCS

## 2021-06-02 PROCEDURE — 97530 THERAPEUTIC ACTIVITIES: CPT

## 2021-06-02 PROCEDURE — 99024 POSTOP FOLLOW-UP VISIT: CPT | Performed by: PHYSICIAN ASSISTANT

## 2021-06-02 PROCEDURE — 2580000003 HC RX 258: Performed by: STUDENT IN AN ORGANIZED HEALTH CARE EDUCATION/TRAINING PROGRAM

## 2021-06-02 PROCEDURE — 6370000000 HC RX 637 (ALT 250 FOR IP): Performed by: STUDENT IN AN ORGANIZED HEALTH CARE EDUCATION/TRAINING PROGRAM

## 2021-06-02 PROCEDURE — 6360000002 HC RX W HCPCS: Performed by: STUDENT IN AN ORGANIZED HEALTH CARE EDUCATION/TRAINING PROGRAM

## 2021-06-02 PROCEDURE — 2500000003 HC RX 250 WO HCPCS: Performed by: STUDENT IN AN ORGANIZED HEALTH CARE EDUCATION/TRAINING PROGRAM

## 2021-06-02 PROCEDURE — 97165 OT EVAL LOW COMPLEX 30 MIN: CPT

## 2021-06-02 PROCEDURE — 97161 PT EVAL LOW COMPLEX 20 MIN: CPT

## 2021-06-02 RX ADMIN — OXYCODONE HYDROCHLORIDE 10 MG: 10 TABLET ORAL at 09:46

## 2021-06-02 RX ADMIN — ASPIRIN 81 MG: 81 TABLET, COATED ORAL at 09:46

## 2021-06-02 RX ADMIN — CEFAZOLIN 2000 MG: 10 INJECTION, POWDER, FOR SOLUTION INTRAVENOUS at 01:40

## 2021-06-02 RX ADMIN — Medication 10 ML: at 09:46

## 2021-06-02 ASSESSMENT — PAIN SCALES - GENERAL
PAINLEVEL_OUTOF10: 0
PAINLEVEL_OUTOF10: 7

## 2021-06-02 NOTE — CARE COORDINATION
6/2/2021 social work transition of care planning  Sw brought in  pad, but pt stated that he understands some english. Pt is from home alone and had been independent. Pt did not report having a pcp at this time. Pt use either [facility] or [facility] on [address]. Explained sw role in transition of care planning. Pt plan is home, pt will call for a ride at discharge. Sw will follow.   Electronically signed by RAHEEM Liz on 6/2/2021 at 11:31 AM

## 2021-06-02 NOTE — PROGRESS NOTES
Department of Orthopedic Surgery  Resident Progress Note    POD 1. Patient seen and examined. Pain controlled. No new complaints. Denies chest pain, shortness of breath, dizziness/lightheadedness. VITALS:  /83   Pulse 108   Temp 99 °F (37.2 °C) (Temporal)   Resp 16   Ht 5' 2\" (1.575 m)   Wt 111 lb 8.8 oz (50.6 kg)   SpO2 96%   BMI 20.40 kg/m²     General: in no acute distress and alert    MUSCULOSKELETAL:   right lower extremity:  · Dressing C/D/I  · Compartments soft and compressible  · +PF/DF/EHL  · +2/4 DP & PT pulses, Brisk Cap refill, Toes warm and perfused  · Distal sensation grossly intact to Peroneals, Sural, Saphenous, and tibial nrs    CBC:   Lab Results   Component Value Date    WBC 12.2 06/01/2021    WBC 12.2 06/01/2021    HGB 14.6 06/01/2021    HGB 14.8 06/01/2021    HCT 45.0 06/01/2021    HCT 44.8 06/01/2021     06/01/2021     06/01/2021     PT/INR:    Lab Results   Component Value Date    PROTIME 11.3 06/01/2021    INR 1.0 06/01/2021       ASSESSMENT  S/P open reduction and internal fixation of right distal tibia fracture with intramedullary nailing of right tibial shaft fracture, locked. - 6/1/21    PLAN      · Continue physical therapy and protocol: NWB - R LE  · 24 hour abx coverage - completed  · Deep venous thrombosis prophylaxis - ASA 81 mg BID, early mobilization  · PT/OT  · Pain Control: transition to PO  · Monitor H&H, asymptomatic  · Discharge planning: Home once therapy works with the patient.     Electronically signed by Miller Brito DO on 6/2/2021 at 7:26 AM
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
## 2021-06-02 NOTE — OP NOTE
thigh, but we never used it. The entire right lower extremity was  prepped and draped in usual sterile fashion over a bump. The bump was  used to elevate the right leg in order to avoid obstruction on the  lateral fluoroscopic image. After the prep and drape was completed, we started initially with the  distal ankle fracture. We started with screw fixation of the posterior  malleolus fracture in the joint. We placed a pin from anteromedial to  posterolateral for this, 4.0 cannulated screws and we grabbed the  posterior malleolar piece with a small incision made posteriorly and a  dental pick. We passed the guidewire from anteromedial to  posterolateral across the tibial plafond just above the ankle joint. This was done under fluoroscopic image. We then put a screw. We  drilled the proximal cortex only and put a short threaded 4.0 cannulated  screw through anteromedial to posterolateral capturing the posterior  malleolar piece. Secondly, we went to the medial malleolus fracture  which extended up into the metaphysis, this is a vertical fracture. We  placed a dental pick on this piece through a small incision reducing it  anatomically and then we placed a K-wire for 4.0 cannulated screw from  medial to lateral across the distal tibia just above the previously  placed screw. This was about the level of the physeal scar. The screw  was longer threaded screw cannulated 4.0 which captured the medial  malleolus and brought it back to good position. We then made a surgical incision anteriorly over the distal one-third  shaft fracture of the tibia. We went just lateral to the anterior  tibial compartment. We evacuated the hematoma. With traction on the  leg, we then reduced the fracture anatomically and held it with a small  Kaur clamp in place and reducing the shaft fracture.     We then went proximally and using the suprapatellar approach with an  incision over the quadriceps tendon and incision through the quadriceps  tendon, we placed the Synthes cannulated guide underneath the patella  into the anatomic starting hole on the proximal tibia. This was done  under fluoroscopic image. We then drilled a starter pin into the  proximal tibia and in appropriate angle. Then, we used a reamer to  start to create the proximal tibial starting hole. We then placed a  ball-tip guidewire into the tibia across the proximal tibia, across the  tibial shaft fracture and to the tibial pilon fracture at a level just  next to the more proximal of the two screws. This was about the level  of physeal scar. The patient had a very short tibia, it was measured  about 310 mm long. We reamed starting with a 8.5 reamer and reamed all  the way up to 11.5. We then placed a 10 mm x 300 mm Synthes titanium  nail through the suprapatellar quadriceps tendon underneath the patella  into the proximal tibia, down the tibial shaft, across the fracture of  the tibial shaft which was direct visualization and then to the level of  the physeal scar distally resting on the pilon screws. We removed the ball-tip guidewire. We placed two proximal screws in the  nail, one static and one dynamic from medial to lateral.  We then moved  distally and we placed four screws in the end of the nail, both secured  the shaft fracture and helped secure the pilon fracture. We used a  anteromedial to posterior lateral screw which was very distal in the  nail, which captured the distal pilon piece. Then, we used another  medial to lateral screw which was also in the distal pilon piece  directly medial to lateral.  We then used a direct anterior to posterior  screw which was just at the level of the long oblique fracture and we  placed another screw from medial to lateral proximally which captured  the long posterior medial piece. All screws were in good position. AP  and lateral fluoroscopic image revealed the nail to be in good position.   The pilon fracture will be reduced and the shaft fracture will be  reduced. We never used a tourniquet. We copiously irrigated out this  area. We removed the proximal end of the nail guide which was on the  nail and we closed the incisions with 0 Vicryl suture, 2-0 Vicryl and  3-0 nylon. Sterile dressing was applied. The patient placed in a  short-leg splint. The patient will remain really nonweightbearing at  this time. We may advance him weightbearing once he gets into a walking  boot once his sutures have been removed.         Murtaza Leigh MD    D: 06/01/2021 10:47:43       T: 06/01/2021 10:52:36     [email or web address]  Job#: [id number]     Doc#: [id number]    CC:

## 2021-06-02 NOTE — PROGRESS NOTES
Physical Therapy    Physical Therapy Initial Assessment       Name: Obdulio Pratt  : 1985  MRN: [id number]      Date of Service: 2021    Evaluating PT:  Gaston Goodwin PT, DPT  [id number]    Room #:  7130/4958-W  Diagnosis:  Closed displaced comminuted fracture of shaft of right tibia, initial encounter [S82.303A]   1. Closed right distal tibia pilon fracture intra-articular. 2.  Right tibial shaft fracture with proximal fibula fracture. PMHx/PSHx:  History reviewed. No pertinent past medical history. Procedure/Surgery:  1. Open reduction and internal fixation of right distal tibia fracture. 2.  Intramedullary nailing of right tibial shaft fracture, locked. Precautions:  NWB RLE, Chinese Speaking, Video    Equipment Needs:  [employer]    SUBJECTIVE:    Pt lives with father in a 2 story home with 1 stairs to enter and bilateral rail. Bed is on 2nd floor and bath is on 2nd floor. Pt ambulated with no device independently PTA. Equipment Owned:     OBJECTIVE:   Initial Evaluation  Date: 21 Treatment Short Term/ Long Term   Goals   AM-PAC 6 Clicks 51/     Was pt agreeable to Eval/treatment? Yes     Does pt have pain? Severe pain RLE     Bed Mobility  Rolling: NT  Supine to sit: Robert  Sit to supine: NT  Scooting: Robert  Rolling: Independent  Supine to sit:  Independent  Sit to supine: Independent  Scooting: Independent     Transfers Sit to stand: SBA  Stand to sit: SBA  Stand pivot: SBA [employer]  Sit to stand: Modified Independent  Stand to sit: Modified Independent  Stand pivot: Modified Independent  AAD   Ambulation   50, 50  feet with SBA WW  20 SBA crutches  >150 feet with Modified Independent  AAD   Stair negotiation: ascended and descended  NT- declined after demo, states he prefers to negotiate on buttock  >4 steps with no rail Modified Independent  AAD   ROM BUE:  Defer to OT  BLE:  WFL     Strength   RLE: 2/5 hip and knee  LLE: 5/5  Improve strength > 1 MMT   Balance Sitting EOB: SBA  Dynamic Standing:  SBA [employer]  Sitting EOB:  Independent    Dynamic Standing:  Modified Independent  AAD     Pt is A & O x 4  Sensation:  WNL  Edema:  WNL      Patient education  Pt educated on role of PT    Patient response to education:   Pt verbalized understanding Pt demonstrated skill Pt requires further education in this area   x x x     ASSESSMENT:    Conditions Requiring Skilled Therapeutic Intervention:    [x]Decreased strength     [x]Decreased ROM  [x]Decreased functional mobility  [x]Decreased balance   [x]Decreased endurance   []Decreased posture  []Decreased sensation  []Decreased coordination   []Decreased vision  [x]Decreased safety awareness   [x]Increased pain       Comments:  Pt received in supine agreeable to PT evaluation. Pt required assist of RLE for supine to sit. Pt performing functional transfers and ambulation without assist. Pt demos improved stability using WW in comparison to crutches for ambulation. Pt in agreement. Pt declined to trial stair negotiation after demonstration, stating he prefers to negotiate on buttock. Patient would benefit from continued skilled PT to maximize functional mobility independence. Treatment:  Patient practiced and was instructed in the following treatment:     Bed mobility- verbal cues to facilitate independence   Functional transfers-Verbal cues for proper positioning and sequencing to perform transfers safely with maximum independence.  Gait training-Verbal cues for proper positioning and sequencing using assistive device to maximize functional mobility independence.  Stair negotiation - verbal cues to facilitate independence      Pt's/ family goals   1. Get better    Prognosis is good for reaching above PT goals. Patient and or family understand(s) diagnosis, prognosis, and plan of care.   yes    PHYSICAL THERAPY PLAN OF CARE:    PT POC is established based on physician order and patient diagnosis     Referring provider/PT Order:  Will DO Christy; PT eval and treat Start: 06/01/21 1300, End: 06/01/21 1300, ONE TIME, Standing Count: 1 Occurrences, R    Diagnosis:  Closed displaced comminuted fracture of shaft of right tibia, initial encounter [S82.343E]  Specific instructions for next treatment:  Increase activity tolerance, improve ROM and strength in RLE    Current Treatment Recommendations:     [x] Strengthening to improve independence with functional mobility   [x] ROM to improve independence with functional mobility   [x] Balance Training to improve static/dynamic balance and to reduce fall risk  [x] Endurance Training to improve activity tolerance during functional mobility   [x] Transfer Training to improve safety and independence with all functional transfers   [x] Gait Training to improve gait mechanics, endurance and assess need for appropriate assistive device  [x] Stair Training in preparation for safe discharge home and/or into the community   [x] Positioning to prevent skin breakdown and contractures  [x] Safety and Education Training   [x] Patient/Caregiver Education   [x] HEP  [] Other     PT long term treatment goals are located in above grid    Frequency of treatments: 5-7x/week x 1-2 weeks. Time in  1320  Time out  1353    Total Treatment Time  23 minutes     Evaluation Time includes thorough review of current medical information, gathering information on past medical history/social history and prior level of function, completion of standardized testing/informal observation of tasks, assessment of data and education on plan of care and goals.     CPT codes:  [x] Low Complexity PT evaluation 32596  [] Moderate Complexity PT evaluation 94084  [] High Complexity PT evaluation 86897  [] PT Re-evaluation 09039  [] Gait training 51120 0 minutes  [] Manual therapy 94845 0 minutes  [x] Therapeutic activities 22785 23 minutes  [] Therapeutic exercises 88674 0 minutes  [] Neuromuscular reeducation 46357 0 minutes       Tutu Brady PT,
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
## 2021-06-11 DIAGNOSIS — S82.251A CLOSED DISPLACED COMMINUTED FRACTURE OF SHAFT OF RIGHT TIBIA, INITIAL ENCOUNTER: Primary | ICD-10-CM

## 2021-06-15 ENCOUNTER — OFFICE VISIT (OUTPATIENT)
Dept: ORTHOPEDIC SURGERY | Age: 36
End: 2021-06-15
Payer: MEDICARE

## 2021-06-15 ENCOUNTER — HOSPITAL ENCOUNTER (OUTPATIENT)
Dept: GENERAL RADIOLOGY | Age: 36
Discharge: HOME | End: 2021-06-17
Payer: MEDICARE

## 2021-06-15 VITALS — TEMPERATURE: 98.8 F

## 2021-06-15 DIAGNOSIS — S82.251D CLOSED DISPLACED COMMINUTED FRACTURE OF SHAFT OF RIGHT TIBIA WITH ROUTINE HEALING, SUBSEQUENT ENCOUNTER: Primary | ICD-10-CM

## 2021-06-15 DIAGNOSIS — S82.891D CLOSED FRACTURE OF RIGHT ANKLE WITH ROUTINE HEALING, SUBSEQUENT ENCOUNTER: ICD-10-CM

## 2021-06-15 DIAGNOSIS — S82.251A CLOSED DISPLACED COMMINUTED FRACTURE OF SHAFT OF RIGHT TIBIA, INITIAL ENCOUNTER: ICD-10-CM

## 2021-06-15 PROCEDURE — 29405 APPL SHORT LEG CAST: CPT | Performed by: PHYSICIAN ASSISTANT

## 2021-06-15 PROCEDURE — 73590 X-RAY EXAM OF LOWER LEG: CPT

## 2021-06-15 PROCEDURE — 99212 OFFICE O/P EST SF 10 MIN: CPT | Performed by: PHYSICIAN ASSISTANT

## 2021-06-15 PROCEDURE — 99024 POSTOP FOLLOW-UP VISIT: CPT | Performed by: PHYSICIAN ASSISTANT

## 2021-06-15 RX ORDER — OXYCODONE HYDROCHLORIDE 5 MG/1
5 TABLET ORAL EVERY 4 HOURS PRN
COMMUNITY

## 2021-06-15 NOTE — PROGRESS NOTES
Chief Complaint   Patient presents with    Fracture      Right tibia ORIF        OP:SURGEON: Dr. Sp Velasquez MD  DATE OF PROCEDURE: 6-1-21  PROCEDURE: ORIF right distal tibia, IM nailing of right tibial shaft fracture    POD: 2 weeks    Subjective:  Cristy Boston is following up from the above surgery. He is NWB on right lower extremity. He ambulates with assistive device, crutches. Pain to extremity is mild and is  taking prescribed pain medication, Percocet. They denies numbness or tingling to the right lower extremity. Denies calf pain, chest pain, or shortness of breath. Patient continues to use DVT prophylaxis, ASA 81 mg BID. Patient is not participating in therapy at this time. Review of Systems -    General ROS: negative for - chills, fatigue, fever or night sweats  Respiratory ROS: no cough, shortness of breath, or wheezing  Cardiovascular ROS: no chest pain or dyspnea on exertion  Gastrointestinal ROS: no abdominal pain, nausea, vomiting, diarrhea, constipation,or black or bloody stools  Genitourinary: no hematuria, dysuria, or incontinence   Musculoskeletal ROS: negative for -back or neck pain or stiffness, also see HPI  Neurological ROS: no TIA or stroke symptoms       Objective:    General: Alert and oriented X 3, normocephalic atraumatic, external ears and eye normal, sclera clear, no acute distress, respirations easy and unlabored with no audible wheezes, skin warm and dry, speech and dress appropriate for noted age, affect euthymic. Extremity:  Right Lower Extremity  Skin clean dry and intact, without signs of infection   Incisions well healed. Sutures were removed today and Steri-Strips applied. mild edema noted to the lower leg area  Compartments supple throughout thigh and leg  Calf supple and not tender  negative Homans  Demonstrates active movement with flexion and extension of the right knee.  He wiggles all of his toes with some limitation due to stiffness and swelling. States sensation intact to touch in sural, deep peroneal, superficial peroneal, saphenous, posterior tibial  nerve distributions to foot/ankle. Palpable dorsalis pedis and posterior tibialis pulses, cap refill brisk in toes, foot warm/perfused. Temp 98.8 °F (37.1 °C) (Oral)     XR:   Right tib-fib x-rays today 2 views demonstrate the ORIF right distal tibia fracture with IM nailing of right tibial shaft fracture showing the hardware in stable position and alignment. No evidence of hardware loosening or failure. Assessment:   Diagnosis Orders   1. Closed displaced comminuted fracture of shaft of right tibia with routine healing, subsequent encounter     2. Closed fracture of right ankle with routine healing, subsequent encounter       Plan:  Reviewed x-rays with patient today in office     Keep right lower extremity cast clean dry and intact. Do not remove cast.    Continue nonweightbearing on the right lower extremity in the cast.    We did give him a form to remain off work at least until his follow-up visit. Continue baby aspirin twice daily for DVT prophylaxis. Follow-up in 3 weeks for reevaluation, x-rays and possible transition to a boot. Call if any questions or concerns. Electronically signed by ROZ Sosa on 6/15/2021 at 3:02 PM  Note: This report was completed using computerXillianTV voiced recognition software. Every effort has been made to ensure accuracy; however, inadvertent computerized transcription errors may be present.

## 2021-06-15 NOTE — LETTER
[address]  [address] [facility] [address]  Phone: [phone]  Fax: [phone]        Jennifer 15, 2021     Patient: Elam Essex   YOB: 1985   Date of Visit: 6/15/2021       To Whom It May Concern: It is my medical opinion that Leena Hernández should remain out of work until At least until he is seen for follow-up in our office in 3 weeks. .    If you have any questions or concerns, please don't hesitate to call.     Sincerely,        ROZ Lloyd
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
## 2021-06-15 NOTE — PATIENT INSTRUCTIONS
Keep right lower extremity cast clean dry and intact. Do not remove cast.    Continue nonweightbearing on the right lower extremity in the cast.    Continue baby aspirin twice daily for DVT prophylaxis. Follow-up in 3 weeks for reevaluation, x-rays and possible transition to a boot. Call if any questions or concerns.

## 2021-07-06 ENCOUNTER — HOSPITAL ENCOUNTER (OUTPATIENT)
Dept: GENERAL RADIOLOGY | Age: 36
Discharge: HOME | End: 2021-07-08
Payer: MEDICARE

## 2021-07-06 ENCOUNTER — OFFICE VISIT (OUTPATIENT)
Dept: ORTHOPEDIC SURGERY | Age: 36
End: 2021-07-06
Payer: MEDICARE

## 2021-07-06 VITALS — TEMPERATURE: 98.5 F

## 2021-07-06 DIAGNOSIS — S82.251D CLOSED DISPLACED COMMINUTED FRACTURE OF SHAFT OF RIGHT TIBIA WITH ROUTINE HEALING, SUBSEQUENT ENCOUNTER: Primary | ICD-10-CM

## 2021-07-06 DIAGNOSIS — S82.251D CLOSED DISPLACED COMMINUTED FRACTURE OF SHAFT OF RIGHT TIBIA WITH ROUTINE HEALING, SUBSEQUENT ENCOUNTER: ICD-10-CM

## 2021-07-06 PROCEDURE — 99024 POSTOP FOLLOW-UP VISIT: CPT | Performed by: PHYSICIAN ASSISTANT

## 2021-07-06 PROCEDURE — 73590 X-RAY EXAM OF LOWER LEG: CPT

## 2021-07-06 PROCEDURE — 99213 OFFICE O/P EST LOW 20 MIN: CPT | Performed by: PHYSICIAN ASSISTANT

## 2021-07-06 PROCEDURE — L4350 ANKLE CONTROL ORTHO PRE OTS: HCPCS | Performed by: PHYSICIAN ASSISTANT

## 2021-07-06 NOTE — PATIENT INSTRUCTIONS
Start progressive weightbearing on the on right lower extremity in Boot. Start with 25% of body weight for 7-10 days and if tolerating well, no significant increased pain or swelling ok to progress to 50% of body weight in Boot for 7-10 days. Then to 75% of body weight for 7-10 days, then to 100% on on right lower extremity in the Boot. Once full weight bearing in the Boot for 1-2 weeks and tolerating well OK to start weaning out of Boot. OK to remove boot for hygiene (shower) and exercises. Follow up in 4 weeks for repeat evaluation and x-rays  Call office if you would like an order for physical therapy. Please call the office at [phone] or send [employer] message to providers sooner with any questions or concerns  Strongly recommend all of our patients sign up for [employer] in order to have direct communication VIA [employer] ZAIDA with our clinic staff. Patient Education        Tobillo: Ejercicios  Ankle: Exercises  Instrucciones de cuidado  Aquí se presentan algunos ejemplos de ejercicios para el tobillo. Empiece cada ejercicio lentamente. Reduzca la intensidad del ejercicio si [place] a tener dolor. Brown médico o fisioterapeuta le dirán cuándo puede comenzar con estos ejercicios y cuáles funcionarán mejor para usted. Cómo hacer los ejercicios  Ejercicio del \"alfabeto\"   1. Trace las letras del alfabeto con el dedo ladonna del pie. [place] ayuda a  el tobillo en todas las direcciones. Ejercicio de balanceo de rodilla de lado a lado   1. Siéntese en nany silla con el pie apoyado en el piso. 2. Mueva lentamente la rodilla de un lado a otro, manteniendo el pie apoyado firmemente contra el piso. 3. Continúe haciendo loraine ejercicio alin 2 o 3 minutos. Flexión con toalla   1. Estando sentado, coloque el pie sobre nany toalla en el suelo y acerque la toalla hacia usted con los dedos del pie. 2. Luego, usando [employer] dedos del pie, aleje la toalla de usted.   3. Joey loraine ejercicio más difícil colocando un objeto pesado, abdoulaye nany han de sopa, en el otro extremo de la toalla. Estiramiento con toalla   1. Siéntese con las piernas extendidas y las rodillas rectas. 2. Coloque nany toalla alrededor de funes pie tuan por debajo de los dedos. 3. Sostenga cada extremo de la toalla con cada mano, con las chi por encima de las rodillas. 4. Tire hacia atrás con la toalla para que el pie se estire hacia usted. 5. Mantenga la posición por lo menos de 15 a 30 segundos. 6. Repita de 2 a 4 veces por sesión, hasta 5 sesiones al día. Ejercicio de eversión del tobillo   1. Comience por sentarse con el pie apoyado en el suelo y empujando hacia afuera contra un objeto fijo, abdoulaye la pared o un mueble pesado. Mantenga la posición por alrededor de 6 segundos y luego relájese. Repita de 8 a 12 veces. 2. Después de que se sienta cómodo con esto, pruebe a utilizar un tubo de goma enrollado alrededor de la parte externa de isis pies para tener más resistencia. Empuje el pie hacia afuera contra el tubo y luego cuente hasta 10 a medida que regresa el pie lentamente hacia el centro. Repita de 8 a 12 veces. Ejercicios isométricos de oposición   1. Estando sentado, ponga los pies juntos en el piso. 2. Presione el pie lesionado hacia adentro, contra el otro pie. Mantenga la posición por alrededor de 6 segundos y luego relájese. 3. Luego coloque el talón del otro pie en la parte superior del pie lesionado. Empuje hacia abajo con el talón que está arriba mientras trata de empujar hacia arriba con funes pie lesionado. Mantenga la posición por alrededor de 6 segundos y luego relájese. Repita de 8 a 12 veces. La atención de seguimiento es nany parte clave de funes tratamiento y seguridad. Asegúrese de hacer y acudir a todas las citas, y llame a funes médico si está teniendo problemas. También es anny buena idea saber los resultados de isis exámenes y mantener nany lista de los medicamentos que agustín.   ¿Dónde puede encontrar más información en inglés? Meridee Pickerel a [email or web address] e ingrese a funes cuenta de MyChart. Tran Pillai B292 en el [facility] \"Search Health Information\" para más información (en inglés) sobre \"Tobillo: Ejercicios. \"     Si no tiene nany cuenta, mary carlene en el enlace \"Sign Up Now\". Revisado: 16 noviembre, 2020               Versión del contenido: 12.9  © 9183-1457 Healthwise, Incorporated. Las instrucciones de cuidado fueron adaptadas bajo licencia por [facility] ([facility]). Si usted tiene [place] afección médica o sobre estas instrucciones, siempre pregunte a funes profesional de morgan. [employer] niega toda garantía o responsabilidad por funes uso de esta información.

## 2021-07-12 NOTE — PROGRESS NOTES
Zarina #[id number] used for translation of office visit.
Annalise Jara is a 29 y.o. male who presents for follow up of right tibia ORIF    SURGEON: Dr. Stephen Lyles MD  Date of Injury/Surgery: 6-1-21  Date last seen in office: 6-15-21    Symptoms: better  New complaints: none    Splint removed prior to X Ray. Suture line healed. Weightbearing:  Non-weight bearing      Assistive device Crutches - axillary  Participating in therapy (location if yes)?  no    Refills Needed: None  Order/Referral Needed: no
tibial fracture status post ORIF. The tibial fracture fragments remain in anatomic position and alignment and stable position of the right tibial long intramedullary nail. The distal right tibial fracture line remains visible and with incomplete fracture healing at present. Additional incomplete healing of an oblique, nondisplaced fracture of the proximal right fibula. Assessment:   Diagnosis Orders   1. Closed displaced comminuted fracture of shaft of right tibia with routine healing, subsequent encounter  XR TIBIA FIBULA RIGHT (2 VIEWS)       Plan:  Start progressive weightbearing on the on right lower extremity in Boot. Start with 25% of body weight for 7-10 days and if tolerating well, no significant increased pain or swelling ok to progress to 50% of body weight in Boot for 7-10 days. Then to 75% of body weight for 7-10 days, then to 100% on on right lower extremity in the Boot. Once full weight bearing in the Boot for 1-2 weeks and tolerating well OK to start weaning out of Boot. OK to remove boot for hygiene (shower) and exercises. Follow up in 4 weeks for repeat evaluation and x-rays  Call office if you would like an order for physical therapy. Electronically signed by Michelle Elaine PA-C on 7/11/2021 at 8:38 PM  Note: This report was completed using computerize voiced recognition software. Every effort has been made to ensure accuracy; however, inadvertent computerized transcription errors may be present.

## 2021-07-23 DIAGNOSIS — S82.251D CLOSED DISPLACED COMMINUTED FRACTURE OF SHAFT OF RIGHT TIBIA WITH ROUTINE HEALING, SUBSEQUENT ENCOUNTER: Primary | ICD-10-CM

## 2021-08-03 ENCOUNTER — OFFICE VISIT (OUTPATIENT)
Dept: ORTHOPEDIC SURGERY | Age: 36
End: 2021-08-03
Payer: MEDICARE

## 2021-08-03 ENCOUNTER — HOSPITAL ENCOUNTER (OUTPATIENT)
Dept: GENERAL RADIOLOGY | Age: 36
Discharge: HOME | End: 2021-08-05
Payer: MEDICARE

## 2021-08-03 ENCOUNTER — HOSPITAL ENCOUNTER (OUTPATIENT)
Age: 36
Discharge: HOME | End: 2021-08-05
Payer: MEDICARE

## 2021-08-03 DIAGNOSIS — S82.251D CLOSED DISPLACED COMMINUTED FRACTURE OF SHAFT OF RIGHT TIBIA WITH ROUTINE HEALING, SUBSEQUENT ENCOUNTER: ICD-10-CM

## 2021-08-03 DIAGNOSIS — S82.251D CLOSED DISPLACED COMMINUTED FRACTURE OF SHAFT OF RIGHT TIBIA WITH ROUTINE HEALING, SUBSEQUENT ENCOUNTER: Primary | ICD-10-CM

## 2021-08-03 PROCEDURE — 99024 POSTOP FOLLOW-UP VISIT: CPT | Performed by: PHYSICIAN ASSISTANT

## 2021-08-03 PROCEDURE — 99212 OFFICE O/P EST SF 10 MIN: CPT | Performed by: ORTHOPAEDIC SURGERY

## 2021-08-03 PROCEDURE — 73590 X-RAY EXAM OF LOWER LEG: CPT

## 2021-08-03 NOTE — PROGRESS NOTES
Robyn Browne is a 29 y.o. male who presents for follow up of right LE    SURGEON: Dr. Jimbo Gotti MD  Date of Injury/Surgery: 6/1/2021  Date last seen in office: 7/6/2021    Symptoms: better  New complaints: none    Incision: well healed    Weightbearing: right lower Weight bearing as tolerated      Assistive device No Device  Participating in therapy (location if yes)?  no    Refills Needed: None  Order/Referral Needed: N/A

## 2021-08-03 NOTE — PROGRESS NOTES
Chief Complaint   Patient presents with    Post-Op Check     R Tib ORIF 6/1/2021   Akhil Knutson  # [id number]         OP:SURGEON: Dr. Stephen Lyles MD  DATE OF PROCEDURE: 6-1-21  PROCEDURE: 1. Open reduction and internal fixation of right distal tibia fracture. 2.  Intramedullary nailing of right tibial shaft fracture, locked. POD: 2 months    Subjective:  Annalise Jara is following up from the above surgery. He is WBAT on right lower extremity. He ambulates with no assistive device. Pain to extremity is none and is not taking prescribed pain medication. They denies numbness or tingling to the right lower extremity. Denies calf pain, chest pain, or shortness of breath. Patient has finished DVT prophylaxis. Patient is not participating in therapy at this time. Overall patient states that he is doing well. He has been weightbearing as tolerated on the right lower extremity with no pain or issues. He has weaned out of the boot without any problems. Review of Systems -  All pertinent positives/negative in HPI     Objective:    General: Alert and oriented X 3, normocephalic atraumatic, external ears and eye normal, sclera clear, no acute distress, respirations easy and unlabored with no audible wheezes, skin warm and dry, speech and dress appropriate for noted age, affect euthymic. Extremity:  Right Lower Extremity  Skin clean dry and intact, without signs of infection  Incision well-healed  no edema noted  Compartments supple throughout thigh and leg  Calf supple and not tender  negative Homans  Demonstrates full active motion with knee flexion and extension as well as ankle dorsi/plantar flexion. Ambulates well without the use of any assistive devices. States sensation intact to touch in sural, deep peroneal, superficial peroneal, saphenous, posterior tibial  nerve distributions to foot/ankle.   Palpable dorsalis pedis and posterior tibialis pulses, cap refill brisk in toes, foot warm/perfused. There were no vitals taken for this visit. XR:   2 views right tib-fib demonstrate the ORIF with IM nail showing the hardware in stable position and alignment. No evidence of hardware loosening or failure. Assessment:   Diagnosis Orders   1. Closed displaced comminuted fracture of shaft of right tibia with routine healing, subsequent encounter       Plan:  X-rays reviewed and discussed today with the patient with the . Continue activity as tolerated    Follow up in 3 months for repeat xrays    Please call the office at [phone] or send Briteseed message to providers sooner with any questions or concerns  Strongly recommend all of our patients sign up for Briteseed in order to have direct communication VIA Briteseed ZAIDA with our clinic staff. Electronically signed by ROZ Godinez on 8/3/2021 at 2:31 PM  Note: This report was completed using computer"[employer] voiced recognition software. Every effort has been made to ensure accuracy; however, inadvertent computerized transcription errors may be present.

## 2022-08-19 ENCOUNTER — HOSPITAL ENCOUNTER (EMERGENCY)
Age: 37
Discharge: HOME | End: 2022-08-19
Payer: MEDICARE

## 2022-08-19 ENCOUNTER — APPOINTMENT (OUTPATIENT)
Dept: GENERAL RADIOLOGY | Age: 37
End: 2022-08-19
Payer: MEDICARE

## 2022-08-19 VITALS
TEMPERATURE: 98.6 F | BODY MASS INDEX: 20.24 KG/M2 | DIASTOLIC BLOOD PRESSURE: 92 MMHG | HEART RATE: 65 BPM | OXYGEN SATURATION: 100 % | SYSTOLIC BLOOD PRESSURE: 142 MMHG | RESPIRATION RATE: 14 BRPM | HEIGHT: 62 IN | WEIGHT: 110 LBS

## 2022-08-19 DIAGNOSIS — S83.92XA SPRAIN OF LEFT KNEE, UNSPECIFIED LIGAMENT, INITIAL ENCOUNTER: ICD-10-CM

## 2022-08-19 DIAGNOSIS — M25.562 ACUTE PAIN OF LEFT KNEE: Primary | ICD-10-CM

## 2022-08-19 PROCEDURE — 73562 X-RAY EXAM OF KNEE 3: CPT

## 2022-08-19 PROCEDURE — 99283 EMERGENCY DEPT VISIT LOW MDM: CPT

## 2022-08-19 RX ORDER — IBUPROFEN 600 MG/1
600 TABLET ORAL 3 TIMES DAILY PRN
Qty: 30 TABLET | Refills: 0 | Status: SHIPPED | OUTPATIENT
Start: 2022-08-19

## 2022-08-19 NOTE — Clinical Note
Salo Chavez was seen and treated in our emergency department on 8/19/2022. He may return to work on 08/21/2022. If you have any questions or concerns, please don't hesitate to call.       Sarah Schultz PA-C

## 2022-08-19 NOTE — ED PROVIDER NOTES
smokeless tobacco. He reports current alcohol use. He reports that he does not use drugs. Family History: family history is not on file. The patients home medications have been reviewed. Allergies: Demerol hcl [meperidine]    --------------------------------- RESULTS ------------------------------------------  All laboratory and radiology results have been personally reviewed by myself   LABS:  No results found for this visit on 08/19/22. RADIOLOGY:  Interpreted by Radiologist.  XR KNEE LEFT (3 VIEWS)   Final Result   No acute abnormality of the knee.             ----------------- NURSING NOTES AND VITALS REVIEWED ---------------   The nursing notes within the ED encounter and vital signs as below have been reviewed. BP (!) 142/92   Pulse 65   Temp 98.6 °F (37 °C)   Resp 14   Ht 5' 2\" (1.575 m)   Wt 110 lb (49.9 kg)   SpO2 100%   BMI 20.12 kg/m²   Oxygen Saturation Interpretation: Normal      --------------------------------PHYSICAL EXAM------------------------------------      Constitutional/General: Alert and oriented x3, well appearing, non toxic in NAD  Head: NC/AT  Eyes: PERRL, EOMI  Mouth: Oropharynx clear, handling secretions, no trismus  Neck: Supple, full ROM, no meningeal signs  Pulmonary: Lungs clear to auscultation bilaterally, no wheezes, rales, or rhonchi. Not in respiratory distress  Cardiovascular:  Regular rate and rhythm, no murmurs, gallops, or rubs. 2+ distal pulses  Extremities: Moves all extremities x 4. Exam of left knee without obvious trauma, edema or deformity. Pt is tender over medial joint space. ROM intact but uncomfortable. No gross instability. Negative anterior and posterior drawer signs. Warm and well perfused  Skin: warm and dry without rash  Neurologic: GCS 15,  Intact.   No focal deficits  Psych: Normal Affect      ------------------------ ED COURSE/MEDICAL DECISION MAKING----------------------  Medications - No data to display      Medical Decision Making:    X-rays of left knee are normal.   No acute fx or bony changes seen. Discussed with patient. All history and disposition obtained through  pad. Plan discharge home with Motrin and instructions for rest and ice. F/U Ortho if persistent. Pt aware and agreeable to plan. Counseling: The emergency provider has spoken with the patient and discussed todays results, in addition to providing specific details for the plan of care and counseling regarding the diagnosis and prognosis. Questions are answered at this time and they are agreeable with the plan.      ------------------------ IMPRESSION AND DISPOSITION -------------------------------    IMPRESSION  1. Acute pain of left knee    2.  Sprain of left knee, unspecified ligament, initial encounter        DISPOSITION  Disposition: Discharge to home  Patient condition is stable                    Griselda Perry, PA-C  08/19/22 1300

## 2022-11-14 ENCOUNTER — OFFICE VISIT (OUTPATIENT)
Dept: FAMILY MEDICINE CLINIC | Age: 37
End: 2022-11-14
Payer: MEDICARE

## 2022-11-14 VITALS
BODY MASS INDEX: 21.74 KG/M2 | TEMPERATURE: 98.6 F | WEIGHT: 118.13 LBS | DIASTOLIC BLOOD PRESSURE: 63 MMHG | HEIGHT: 62 IN | OXYGEN SATURATION: 99 % | SYSTOLIC BLOOD PRESSURE: 97 MMHG | HEART RATE: 78 BPM

## 2022-11-14 DIAGNOSIS — M41.9 SCOLIOSIS, UNSPECIFIED SCOLIOSIS TYPE, UNSPECIFIED SPINAL REGION: ICD-10-CM

## 2022-11-14 DIAGNOSIS — M54.50 ACUTE BILATERAL LOW BACK PAIN WITHOUT SCIATICA: ICD-10-CM

## 2022-11-14 DIAGNOSIS — Q87.2 VATER SYNDROME: ICD-10-CM

## 2022-11-14 DIAGNOSIS — N53.12 PAIN WITH EJACULATION: ICD-10-CM

## 2022-11-14 DIAGNOSIS — Q87.2 VATER SYNDROME: Primary | ICD-10-CM

## 2022-11-14 DIAGNOSIS — R35.0 URINARY FREQUENCY: ICD-10-CM

## 2022-11-14 DIAGNOSIS — N53.14 RETROGRADE EJACULATION: ICD-10-CM

## 2022-11-14 LAB
ALBUMIN SERPL-MCNC: 4.3 G/DL (ref 3.5–5.2)
ALP BLD-CCNC: 66 U/L (ref 40–129)
ALT SERPL-CCNC: 9 U/L (ref 0–40)
ANION GAP SERPL CALCULATED.3IONS-SCNC: 11 MMOL/L (ref 7–16)
AST SERPL-CCNC: 19 U/L (ref 0–39)
BASOPHILS ABSOLUTE: 0.09 E9/L (ref 0–0.2)
BASOPHILS RELATIVE PERCENT: 1.5 % (ref 0–2)
BILIRUB SERPL-MCNC: 0.3 MG/DL (ref 0–1.2)
BILIRUBIN URINE: NEGATIVE
BLOOD, URINE: NEGATIVE
BUN BLDV-MCNC: 9 MG/DL (ref 6–20)
CALCIUM SERPL-MCNC: 10.2 MG/DL (ref 8.6–10.2)
CHLORIDE BLD-SCNC: 103 MMOL/L (ref 98–107)
CLARITY: CLEAR
CO2: 28 MMOL/L (ref 22–29)
COLOR: YELLOW
CREAT SERPL-MCNC: 0.7 MG/DL (ref 0.7–1.2)
EOSINOPHILS ABSOLUTE: 0.23 E9/L (ref 0.05–0.5)
EOSINOPHILS RELATIVE PERCENT: 3.8 % (ref 0–6)
GFR SERPL CREATININE-BSD FRML MDRD: >60 ML/MIN/1.73
GLUCOSE BLD-MCNC: 85 MG/DL (ref 74–99)
GLUCOSE URINE: NEGATIVE MG/DL
HCT VFR BLD CALC: 48 % (ref 37–54)
HEMOGLOBIN: 15.5 G/DL (ref 12.5–16.5)
IMMATURE GRANULOCYTES #: 0.01 E9/L
IMMATURE GRANULOCYTES %: 0.2 % (ref 0–5)
KETONES, URINE: NEGATIVE MG/DL
LEUKOCYTE ESTERASE, URINE: NEGATIVE
LYMPHOCYTES ABSOLUTE: 1.62 E9/L (ref 1.5–4)
LYMPHOCYTES RELATIVE PERCENT: 26.6 % (ref 20–42)
MCH RBC QN AUTO: 31.3 PG (ref 26–35)
MCHC RBC AUTO-ENTMCNC: 32.3 % (ref 32–34.5)
MCV RBC AUTO: 96.8 FL (ref 80–99.9)
MONOCYTES ABSOLUTE: 0.32 E9/L (ref 0.1–0.95)
MONOCYTES RELATIVE PERCENT: 5.2 % (ref 2–12)
NEUTROPHILS ABSOLUTE: 3.83 E9/L (ref 1.8–7.3)
NEUTROPHILS RELATIVE PERCENT: 62.7 % (ref 43–80)
NITRITE, URINE: NEGATIVE
PDW BLD-RTO: 13.3 FL (ref 11.5–15)
PH UA: 6.5 (ref 5–9)
PLATELET # BLD: 309 E9/L (ref 130–450)
PMV BLD AUTO: 11.8 FL (ref 7–12)
POTASSIUM SERPL-SCNC: 4.4 MMOL/L (ref 3.5–5)
PROTEIN UA: NEGATIVE MG/DL
RBC # BLD: 4.96 E12/L (ref 3.8–5.8)
SODIUM BLD-SCNC: 142 MMOL/L (ref 132–146)
SPECIFIC GRAVITY UA: 1.01 (ref 1–1.03)
TOTAL PROTEIN: 7.5 G/DL (ref 6.4–8.3)
UROBILINOGEN, URINE: 0.2 E.U./DL
WBC # BLD: 6.1 E9/L (ref 4.5–11.5)

## 2022-11-14 PROCEDURE — G8427 DOCREV CUR MEDS BY ELIG CLIN: HCPCS | Performed by: STUDENT IN AN ORGANIZED HEALTH CARE EDUCATION/TRAINING PROGRAM

## 2022-11-14 PROCEDURE — 4004F PT TOBACCO SCREEN RCVD TLK: CPT | Performed by: STUDENT IN AN ORGANIZED HEALTH CARE EDUCATION/TRAINING PROGRAM

## 2022-11-14 PROCEDURE — 99213 OFFICE O/P EST LOW 20 MIN: CPT | Performed by: STUDENT IN AN ORGANIZED HEALTH CARE EDUCATION/TRAINING PROGRAM

## 2022-11-14 PROCEDURE — G8420 CALC BMI NORM PARAMETERS: HCPCS | Performed by: STUDENT IN AN ORGANIZED HEALTH CARE EDUCATION/TRAINING PROGRAM

## 2022-11-14 PROCEDURE — G8484 FLU IMMUNIZE NO ADMIN: HCPCS | Performed by: STUDENT IN AN ORGANIZED HEALTH CARE EDUCATION/TRAINING PROGRAM

## 2022-11-14 PROCEDURE — 36415 COLL VENOUS BLD VENIPUNCTURE: CPT | Performed by: FAMILY MEDICINE

## 2022-11-14 SDOH — ECONOMIC STABILITY: FOOD INSECURITY: WITHIN THE PAST 12 MONTHS, THE FOOD YOU BOUGHT JUST DIDN'T LAST AND YOU DIDN'T HAVE MONEY TO GET MORE.: NEVER TRUE

## 2022-11-14 SDOH — ECONOMIC STABILITY: FOOD INSECURITY: WITHIN THE PAST 12 MONTHS, YOU WORRIED THAT YOUR FOOD WOULD RUN OUT BEFORE YOU GOT MONEY TO BUY MORE.: NEVER TRUE

## 2022-11-14 ASSESSMENT — PATIENT HEALTH QUESTIONNAIRE - PHQ9
SUM OF ALL RESPONSES TO PHQ9 QUESTIONS 1 & 2: 0
SUM OF ALL RESPONSES TO PHQ QUESTIONS 1-9: 0
2. FEELING DOWN, DEPRESSED OR HOPELESS: 0
SUM OF ALL RESPONSES TO PHQ QUESTIONS 1-9: 0
1. LITTLE INTEREST OR PLEASURE IN DOING THINGS: 0
SUM OF ALL RESPONSES TO PHQ QUESTIONS 1-9: 0
SUM OF ALL RESPONSES TO PHQ QUESTIONS 1-9: 0

## 2022-11-14 ASSESSMENT — SOCIAL DETERMINANTS OF HEALTH (SDOH): HOW HARD IS IT FOR YOU TO PAY FOR THE VERY BASICS LIKE FOOD, HOUSING, MEDICAL CARE, AND HEATING?: NOT HARD AT ALL

## 2022-11-14 NOTE — PROGRESS NOTES
[facility]  Department of [facility]  Family Medicine Residency Program  DATE OF VISIT : 2022      Patient:  Lexx Severino  Age: 40 y.o.       : 1985      Chief complaint:   Chief Complaint   Patient presents with    New Patient         History of Present Illness     Lexx Severino is a 40 y.o. male who presented to the clinic today for establish care. Lower back pain  - 2 weeks of lower back pain. - no trauma or heavy lifting.   - does not radiate, no red flags  - hx of Vater syndrome.    - reports taking OTC homeopathic medications and alleviates some of the symptoms , unsure of ingredients. Urinary frequency/ urgency for the past month  - denies any burning with urination or blood in urine  - denies any discharge from penis    Pain with ejaculation  - reports having pain at times with ejaculation  - denies any semen with ejaculation  - no blood with ejaculation. Medication List:    Current Outpatient Medications   Medication Sig Dispense Refill    ibuprofen (ADVIL;MOTRIN) 600 MG tablet Take 1 tablet by mouth 3 times daily as needed for Pain (Patient not taking: Reported on 2022) 30 tablet 0     No current facility-administered medications for this visit. ROS   Reviewed as above, otherwise negative     Physical Exam   Vitals:   Vitals:    22 0948   BP: 97/63   Pulse: 78   Temp: 98.6 °F (37 °C)   SpO2: 99%       Physical Exam  Vitals reviewed. Constitutional:       Appearance: Normal appearance. HENT:      Head: Normocephalic and atraumatic. Cardiovascular:      Rate and Rhythm: Normal rate and regular rhythm. Pulses: Normal pulses. Heart sounds: Normal heart sounds. Pulmonary:      Effort: Pulmonary effort is normal.      Breath sounds: Normal breath sounds. Abdominal:      General: Bowel sounds are normal.      Palpations: Abdomen is soft.    Musculoskeletal:      Lumbar back: Tenderness present. No bony tenderness. Skin:     Comments: Well healed abdominal scar noted. Neurological:      Mental Status: He is alert. Psychiatric:         Mood and Affect: Mood normal.         Behavior: Behavior normal.         Assessment and Plan     1. VATER syndrome  - given hx of VATER syndrome and new onset urinary issues will order UA, US of kidney and refer to Urology  - does not appear to be a kidney stone or UTI based on presentation  - CBC with Auto Differential; Future  - Comprehensive Metabolic Panel; Future  - US RETROPERITONEAL COMPLETE; Future  - XR LUMBAR SPINE (2-3 VIEWS); Future  - AFL - Theodoro Canavan, MD, Urology, [place]    2. Scoliosis, unspecified scoliosis type, unspecified spinal region  - XR LUMBAR SPINE (2-3 VIEWS); Future    3. Acute bilateral low back pain without sciatica  - see plan above under VATER syndrome  - US RETROPERITONEAL COMPLETE; Future  - XR LUMBAR SPINE (2-3 VIEWS); Future    4. Urinary frequency  - CBC with Auto Differential; Future  - Comprehensive Metabolic Panel; Future  - Urinalysis; Future  - Culture, Urine; Future  - AFL - Theodoro Canavan, MD, Urology, [place]    5. Pain with ejaculation  - PAYTON - Theodoro Canavan, MD, Urology, [place]    6.  Retrograde ejaculation  - PAYTON - Theodoro Canavan, MD, Urology, [facility]      RTO 1 month  Case discussed with Dr. Treasure Mares MD

## 2022-11-14 NOTE — PROGRESS NOTES
S: 40 y.o. male presents today for New Patient      Establish care  Hx of vater syndrome and required surgeries    Hx of low back pain in the last few weeks; no trauma or inciting event  Localized to low back / aching; no red flag symptoms    increased urinary frequency / urgency: 1 month; no other symptoms  Pain with ejaculation    O: VS: BP 97/63   Pulse 78   Temp 98.6 °F (37 °C) (Temporal)   Ht 5' 2\" (1.575 m)   Wt 118 lb 2 oz (53.6 kg)   SpO2 99%   BMI 21.61 kg/m²   AAO/NAD, appropriate affect for mood  CV:  RRR, no murmur  Resp: CTAB  Abdomen: SNTND  Ext: now edema  Msk - paraspinal tenderness lumbar region    Assessment/Plan:   1) Establish care  2) Low back pain - trial of muscle relaxer; xray of lumbar spine  3) urinary urgency / frequency - UA; urine culture; renal US  4) pain with ejaculation - if not able to get in with urology, bring back for prostate exam and consider empiric treatment for prostatitis until he can be seen  5) HM, labs as ordered  RTO: 2-4 weeks    Attending Physician Statement  I have discussed the case, including pertinent history and exam findings with the resident. I agree with the documented assessment and plan.       Electronically signed by Sacha North MD on 11/16/2022 at 5:22 PM

## 2022-11-17 ENCOUNTER — TELEPHONE (OUTPATIENT)
Dept: FAMILY MEDICINE CLINIC | Age: 37
End: 2022-11-17

## 2022-11-17 LAB
ORGANISM: ABNORMAL
URINE CULTURE, ROUTINE: ABNORMAL

## 2022-11-17 RX ORDER — CIPROFLOXACIN 500 MG/1
500 TABLET, FILM COATED ORAL 2 TIMES DAILY
Qty: 14 TABLET | Refills: 0 | Status: SHIPPED | OUTPATIENT
Start: 2022-11-17 | End: 2022-11-24

## 2022-11-17 NOTE — TELEPHONE ENCOUNTER
Tried to call patient 3 times on 3 telephone numbers per chart. No response. No voicemail activated. Will send antibiotics to his pharmacy and try to reach patient again via telephone.     This note is electronically signed by Marek Tate MD on 11/17/2022 at 1:09 PM

## 2022-11-28 ENCOUNTER — HOSPITAL ENCOUNTER (OUTPATIENT)
Age: 37
Discharge: HOME | End: 2022-11-30
Payer: MEDICARE

## 2022-11-28 ENCOUNTER — TELEPHONE (OUTPATIENT)
Dept: FAMILY MEDICINE CLINIC | Age: 37
End: 2022-11-28

## 2022-11-28 ENCOUNTER — HOSPITAL ENCOUNTER (OUTPATIENT)
Dept: GENERAL RADIOLOGY | Age: 37
Discharge: HOME | End: 2022-11-30
Payer: MEDICARE

## 2022-11-28 DIAGNOSIS — M41.9 SCOLIOSIS, UNSPECIFIED SCOLIOSIS TYPE, UNSPECIFIED SPINAL REGION: ICD-10-CM

## 2022-11-28 DIAGNOSIS — S22.080A COMPRESSION FRACTURE OF T12 VERTEBRA, INITIAL ENCOUNTER (HCC): Primary | ICD-10-CM

## 2022-11-28 DIAGNOSIS — M54.50 ACUTE BILATERAL LOW BACK PAIN WITHOUT SCIATICA: ICD-10-CM

## 2022-11-28 DIAGNOSIS — Q87.2 VATER SYNDROME: ICD-10-CM

## 2022-11-28 PROCEDURE — 72100 X-RAY EXAM L-S SPINE 2/3 VWS: CPT

## 2022-11-28 NOTE — TELEPHONE ENCOUNTER
history of VATER syndrome - significant wedge-shaped compression involving the T12 vertebral body.    Will refer to neurosurgery at this time for further assessment

## 2022-12-09 ENCOUNTER — HOSPITAL ENCOUNTER (OUTPATIENT)
Dept: ULTRASOUND IMAGING | Age: 37
End: 2022-12-09
Payer: MEDICARE

## 2022-12-09 DIAGNOSIS — Q87.2 VATER SYNDROME: ICD-10-CM

## 2022-12-09 DIAGNOSIS — M54.50 ACUTE BILATERAL LOW BACK PAIN WITHOUT SCIATICA: ICD-10-CM

## 2022-12-09 PROCEDURE — 76770 US EXAM ABDO BACK WALL COMP: CPT

## 2022-12-12 ENCOUNTER — OFFICE VISIT (OUTPATIENT)
Dept: FAMILY MEDICINE CLINIC | Age: 37
End: 2022-12-12
Payer: MEDICARE

## 2022-12-12 VITALS
HEIGHT: 62 IN | BODY MASS INDEX: 22.08 KG/M2 | WEIGHT: 120 LBS | TEMPERATURE: 97.3 F | DIASTOLIC BLOOD PRESSURE: 74 MMHG | SYSTOLIC BLOOD PRESSURE: 112 MMHG | RESPIRATION RATE: 18 BRPM | HEART RATE: 63 BPM | OXYGEN SATURATION: 98 %

## 2022-12-12 DIAGNOSIS — N52.8 OTHER MALE ERECTILE DYSFUNCTION: ICD-10-CM

## 2022-12-12 DIAGNOSIS — N39.0 URINARY TRACT INFECTION WITHOUT HEMATURIA, SITE UNSPECIFIED: ICD-10-CM

## 2022-12-12 DIAGNOSIS — S22.080A COMPRESSION FRACTURE OF T12 VERTEBRA, INITIAL ENCOUNTER (HCC): Primary | ICD-10-CM

## 2022-12-12 PROCEDURE — G8420 CALC BMI NORM PARAMETERS: HCPCS | Performed by: STUDENT IN AN ORGANIZED HEALTH CARE EDUCATION/TRAINING PROGRAM

## 2022-12-12 PROCEDURE — 4004F PT TOBACCO SCREEN RCVD TLK: CPT | Performed by: STUDENT IN AN ORGANIZED HEALTH CARE EDUCATION/TRAINING PROGRAM

## 2022-12-12 PROCEDURE — 99213 OFFICE O/P EST LOW 20 MIN: CPT | Performed by: STUDENT IN AN ORGANIZED HEALTH CARE EDUCATION/TRAINING PROGRAM

## 2022-12-12 PROCEDURE — 99212 OFFICE O/P EST SF 10 MIN: CPT | Performed by: STUDENT IN AN ORGANIZED HEALTH CARE EDUCATION/TRAINING PROGRAM

## 2022-12-12 PROCEDURE — G8427 DOCREV CUR MEDS BY ELIG CLIN: HCPCS | Performed by: STUDENT IN AN ORGANIZED HEALTH CARE EDUCATION/TRAINING PROGRAM

## 2022-12-12 PROCEDURE — G8484 FLU IMMUNIZE NO ADMIN: HCPCS | Performed by: STUDENT IN AN ORGANIZED HEALTH CARE EDUCATION/TRAINING PROGRAM

## 2022-12-12 RX ORDER — SILDENAFIL 50 MG/1
50 TABLET, FILM COATED ORAL PRN
Qty: 10 TABLET | Refills: 5 | Status: SHIPPED | OUTPATIENT
Start: 2022-12-12

## 2022-12-12 NOTE — PROGRESS NOTES
S: 40 y.o. male here for back pain. Wedge shape compression T12. Ibuprofen helps a bit. Kidney stone    O: VS: /74 (Site: Right Upper Arm, Position: Sitting, Cuff Size: Small Adult)   Pulse 63   Temp 97.3 °F (36.3 °C) (Temporal)   Resp 18   Ht 5' 2\" (1.575 m)   Wt 120 lb (54.4 kg)   SpO2 98%   BMI 21.95 kg/m²    General: NAD, alert and interacting appropriately. CV:  RRR, no gallops, rubs, or murmurs    Resp: CTAB   Abd:  Soft, nontender   Ext:  No edema   Back: scoliosis. Ttp lumbar spinal/paraspinal    Impression: back pain for couple months, compression anomaly. Plan:   MRI LS. Referral to Neurosurg if needed. Ibuprofen  PT  CTM stone, urology referral      Attending Physician Statement  I have discussed the case, including pertinent history and exam findings with the resident. I agree with the documented assessment and plan.

## 2022-12-12 NOTE — PROGRESS NOTES
[facility] [facility]  Department of Family Medicine  Family Medicine Residency Program  DATE OF VISIT : 2022      Patient:  Jc Jarrett  Age: 40 y.o.       : 1985      Chief complaint:   Chief Complaint   Patient presents with    Back Pain         History of Present Illness     Jc Jarrett is a 40 y.o. male who presented to the clinic today for back pain    Chronic lumbar back pain  - extensive work up done at previous visit, including US, Urine culture and X ray. US showed: Nonobstructing 4 mm right renal calculi. Urine culture - Streptococcus mitis/oralis Abnormal. Was prescribed ciprofloxacin. Has completed antibiotic course. X ray of lumbar spine:Acute kyphosis between T12 and L2, secondary to block L1-2 vertebrae and a significant wedge-shaped compression involving the T12 vertebral body. There may be lor vertebra present at T12. Some alleviation with ibuprofen. -  continues to have lower back pain pain. Worse in Am. - movement makes it worse. - denies trauma to lower. UTI  - denies any issues with antibiotics  - denies any urinary symptoms at this time, such as pain or burning with urination    ED  - reports unable to maintain an erection for the past several months  - continues to have nocturnal erections at times  - denies any issues initiating erection. Medication List:    Current Outpatient Medications   Medication Sig Dispense Refill    sildenafil (VIAGRA) 50 MG tablet Take 1 tablet by mouth as needed for Erectile Dysfunction 10 tablet 5    ibuprofen (ADVIL;MOTRIN) 600 MG tablet Take 1 tablet by mouth 3 times daily as needed for Pain (Patient not taking: No sig reported) 30 tablet 0     No current facility-administered medications for this visit.             ROS   Reviewed as above, otherwise negative       Physical Exam   Vitals:   Vitals:    22 1347   BP: 112/74   Pulse: 63   Resp: 18   Temp: 97.3 °F (36.3 °C)   SpO2: 98% Physical Exam  Vitals reviewed. Constitutional:       Appearance: Normal appearance. HENT:      Head: Normocephalic and atraumatic. Cardiovascular:      Rate and Rhythm: Normal rate and regular rhythm. Pulses: Normal pulses. Heart sounds: Normal heart sounds. Pulmonary:      Effort: Pulmonary effort is normal.      Breath sounds: Normal breath sounds. Abdominal:      General: Bowel sounds are normal.      Palpations: Abdomen is soft. Musculoskeletal:      Lumbar back: Tenderness present. No bony tenderness. Comments: Scoliosis noted    Skin:     Comments: Well healed abdominal scar noted. Neurological:      Mental Status: He is alert. Psychiatric:         Mood and Affect: Mood normal.         Behavior: Behavior normal.         Assessment and Plan     1. Compression fracture of T12 vertebra, initial encounter ([facility] [address]  - continue ibuprofen for pain management  - will order MRI for further workup  - MRI LUMBAR SPINE [address]; Future  - [facility], [place], [facility] [address]    2. Urinary tract infection without hematuria, site unspecified  - completed antibiotic course. - repeat Urine culture at this time  - Culture, Urine; Future    3. Other male erectile dysfunction  - recommend to FU with urology. - trail of viagra at this time. - am testosterone. - sildenafil (VIAGRA) 50 MG tablet; Take 1 tablet by mouth as needed for Erectile Dysfunction  Dispense: 10 tablet;  Refill: 5  - Testosterone, free, total; Future      RTO 2 months  Case discussed with Dr. Cecile Osgood, MD

## 2023-01-09 ENCOUNTER — APPOINTMENT (OUTPATIENT)
Dept: ULTRASOUND IMAGING | Age: 38
End: 2023-01-09
Payer: MEDICARE

## 2023-01-09 ENCOUNTER — HOSPITAL ENCOUNTER (EMERGENCY)
Age: 38
Discharge: HOME | End: 2023-01-09
Payer: MEDICARE

## 2023-01-09 ENCOUNTER — APPOINTMENT (OUTPATIENT)
Dept: GENERAL RADIOLOGY | Age: 38
End: 2023-01-09
Payer: MEDICARE

## 2023-01-09 VITALS
HEIGHT: 62 IN | WEIGHT: 120 LBS | RESPIRATION RATE: 16 BRPM | DIASTOLIC BLOOD PRESSURE: 78 MMHG | TEMPERATURE: 97.8 F | HEART RATE: 87 BPM | OXYGEN SATURATION: 100 % | SYSTOLIC BLOOD PRESSURE: 115 MMHG | BODY MASS INDEX: 22.08 KG/M2

## 2023-01-09 DIAGNOSIS — L03.119 CELLULITIS AND ABSCESS OF LEG: Primary | ICD-10-CM

## 2023-01-09 DIAGNOSIS — M25.561 ACUTE PAIN OF RIGHT KNEE: ICD-10-CM

## 2023-01-09 DIAGNOSIS — L02.419 CELLULITIS AND ABSCESS OF LEG: Primary | ICD-10-CM

## 2023-01-09 LAB
ANION GAP SERPL CALCULATED.3IONS-SCNC: 11 MMOL/L (ref 7–16)
BASOPHILS ABSOLUTE: 0.09 E9/L (ref 0–0.2)
BASOPHILS RELATIVE PERCENT: 0.8 % (ref 0–2)
BUN BLDV-MCNC: 9 MG/DL (ref 6–20)
C-REACTIVE PROTEIN: 0.5 MG/DL (ref 0–0.4)
CALCIUM SERPL-MCNC: 9.9 MG/DL (ref 8.6–10.2)
CHLORIDE BLD-SCNC: 99 MMOL/L (ref 98–107)
CO2: 30 MMOL/L (ref 22–29)
CREAT SERPL-MCNC: 0.7 MG/DL (ref 0.7–1.2)
EOSINOPHILS ABSOLUTE: 0.07 E9/L (ref 0.05–0.5)
EOSINOPHILS RELATIVE PERCENT: 0.6 % (ref 0–6)
GFR SERPL CREATININE-BSD FRML MDRD: >60 ML/MIN/1.73
GLUCOSE BLD-MCNC: 107 MG/DL (ref 74–99)
HCT VFR BLD CALC: 43 % (ref 37–54)
HEMOGLOBIN: 14.5 G/DL (ref 12.5–16.5)
IMMATURE GRANULOCYTES #: 0.02 E9/L
IMMATURE GRANULOCYTES %: 0.2 % (ref 0–5)
LYMPHOCYTES ABSOLUTE: 1.87 E9/L (ref 1.5–4)
LYMPHOCYTES RELATIVE PERCENT: 17.1 % (ref 20–42)
MCH RBC QN AUTO: 31.3 PG (ref 26–35)
MCHC RBC AUTO-ENTMCNC: 33.7 % (ref 32–34.5)
MCV RBC AUTO: 92.7 FL (ref 80–99.9)
MONOCYTES ABSOLUTE: 0.53 E9/L (ref 0.1–0.95)
MONOCYTES RELATIVE PERCENT: 4.8 % (ref 2–12)
NEUTROPHILS ABSOLUTE: 8.37 E9/L (ref 1.8–7.3)
NEUTROPHILS RELATIVE PERCENT: 76.5 % (ref 43–80)
PDW BLD-RTO: 13.2 FL (ref 11.5–15)
PLATELET # BLD: 243 E9/L (ref 130–450)
PMV BLD AUTO: 11.4 FL (ref 7–12)
POTASSIUM SERPL-SCNC: 3.8 MMOL/L (ref 3.5–5)
RBC # BLD: 4.64 E12/L (ref 3.8–5.8)
SODIUM BLD-SCNC: 140 MMOL/L (ref 132–146)
WBC # BLD: 11 E9/L (ref 4.5–11.5)

## 2023-01-09 PROCEDURE — 99284 EMERGENCY DEPT VISIT MOD MDM: CPT

## 2023-01-09 PROCEDURE — 85025 COMPLETE CBC W/AUTO DIFF WBC: CPT

## 2023-01-09 PROCEDURE — 93971 EXTREMITY STUDY: CPT

## 2023-01-09 PROCEDURE — 86140 C-REACTIVE PROTEIN: CPT

## 2023-01-09 PROCEDURE — 80048 BASIC METABOLIC PNL TOTAL CA: CPT

## 2023-01-09 PROCEDURE — 6370000000 HC RX 637 (ALT 250 FOR IP): Performed by: NURSE PRACTITIONER

## 2023-01-09 PROCEDURE — 36415 COLL VENOUS BLD VENIPUNCTURE: CPT

## 2023-01-09 PROCEDURE — 73562 X-RAY EXAM OF KNEE 3: CPT

## 2023-01-09 RX ORDER — CEPHALEXIN 500 MG/1
500 CAPSULE ORAL 4 TIMES DAILY
Qty: 28 CAPSULE | Refills: 0 | Status: SHIPPED | OUTPATIENT
Start: 2023-01-09 | End: 2023-01-16

## 2023-01-09 RX ORDER — CEPHALEXIN 500 MG/1
500 CAPSULE ORAL ONCE
Status: COMPLETED | OUTPATIENT
Start: 2023-01-09 | End: 2023-01-09

## 2023-01-09 RX ORDER — SULFAMETHOXAZOLE AND TRIMETHOPRIM 800; 160 MG/1; MG/1
1 TABLET ORAL ONCE
Status: COMPLETED | OUTPATIENT
Start: 2023-01-09 | End: 2023-01-09

## 2023-01-09 RX ORDER — SULFAMETHOXAZOLE AND TRIMETHOPRIM 800; 160 MG/1; MG/1
1 TABLET ORAL 2 TIMES DAILY
Qty: 10 TABLET | Refills: 0 | Status: SHIPPED | OUTPATIENT
Start: 2023-01-09 | End: 2023-01-14

## 2023-01-09 RX ORDER — IBUPROFEN 800 MG/1
800 TABLET ORAL ONCE
Status: COMPLETED | OUTPATIENT
Start: 2023-01-09 | End: 2023-01-09

## 2023-01-09 RX ADMIN — SULFAMETHOXAZOLE AND TRIMETHOPRIM 1 TABLET: 800; 160 TABLET ORAL at 20:47

## 2023-01-09 RX ADMIN — CEPHALEXIN 500 MG: 500 CAPSULE ORAL at 20:47

## 2023-01-09 RX ADMIN — IBUPROFEN 800 MG: 800 TABLET, FILM COATED ORAL at 20:47

## 2023-01-09 ASSESSMENT — PAIN - FUNCTIONAL ASSESSMENT: PAIN_FUNCTIONAL_ASSESSMENT: 0-10

## 2023-01-09 ASSESSMENT — PAIN SCALES - GENERAL: PAINLEVEL_OUTOF10: 8

## 2023-01-09 NOTE — ED PROVIDER NOTES
Independent ZAIDA Visit. [facility]  Department of Emergency Medicine   ED  Encounter Note  Admit Date/RoomTime: 2023  5:06 PM  ED Room:     NAME: Cherry Velasquez  : 1985  MRN: [id number]     Chief Complaint:  Knee Pain (Right knee pain, no known injuries)    History of Present Illness       Cherry Velasquez is a 40 y.o. old male who presents to the emergency department by private vehicle, for non-traumatic right knee pain which started 3-4 days ago. Pain worse with movement. There is a wound to the anterior portion of his knee with associated swelling, redness and warmth localized around the wound/abscess. Patient denies fevers, chills, nausea, vomiting, diarrhea. Patient states at time, pain radiates up to his right thigh. Patient does not recall how the wound/abscess started. ROS   Pertinent positives and negatives are stated within HPI, all other systems reviewed and are negative. Past Medical History:  has a past medical history of VATER syndrome. Surgical History:  has a past surgical history that includes Tibia fracture surgery (Right, 2021); tracheostomy; and Colon surgery. Social History:  reports that he has been smoking cigarettes. He has a 1.00 pack-year smoking history. He has never used smokeless tobacco. He reports current alcohol use. He reports that he does not use drugs. Family History: family history includes Asthma in his mother. Allergies: Demerol hcl [meperidine]    Physical Exam   Oxygen Saturation Interpretation: Normal.        ED Triage Vitals [23 1704]   BP Temp Temp src Heart Rate Resp SpO2 Height Weight   115/78 97.8 °F (36.6 °C) -- 87 16 100 % 5' 2\" (1.575 m) 120 lb (54.4 kg)         Constitutional:  Alert, development consistent with age. Neck:  Normal ROM. Supple. Respiratory:  Regular, non-labored, no tachypnea. Physical Exam  Right Knee: medial, anterior            Tenderness:  moderate. Loreatha Press Swelling/Effusion: Mild. Deformity: no deformity observed/palpated. ROM: full range of motion. Skin:  abscess formation measuring approx. 3cm cm x 3cm cm. No drainage, no fluctuance. Crepitus: Negative. Hip:            Tenderness:  none. Swelling: None. Deformity: no.              ROM: full range of motion. Skin:  no wounds, erythema, or swelling. Joint(s) Above/Below: ankle. Tenderness:  none. Swelling: No.              Deformity: no deformity observed/palpated. ROM: full range of motion. Skin:  no wounds, erythema, or swelling. Neurovascular: Motor deficit: none. Sensory deficit: none. Pulse deficit: none, normal palpable PT/DP pulses. Capillary refill: normal.  Gait:  limp due to affected limb. Lymphatics: No lymphangitis or adenopathy noted. Neurological:  Oriented. Motor functions intact. GCS 15, fluent speech, ambulatory.      Lab / Imaging Results   (All laboratory and radiology results have been personally reviewed by myself)  Labs:  Results for orders placed or performed during the hospital encounter of 44/27/36   Basic Metabolic Panel   Result Value Ref Range    Sodium 140 132 - 146 mmol/L    Potassium 3.8 3.5 - 5.0 mmol/L    Chloride 99 98 - 107 mmol/L    CO2 30 (H) 22 - 29 mmol/L    Anion Gap 11 7 - 16 mmol/L    Glucose 107 (H) 74 - 99 mg/dL    BUN 9 6 - 20 mg/dL    Creatinine 0.7 0.7 - 1.2 mg/dL    Est, Glom Filt Rate >60 >=60 mL/min/1.73    Calcium 9.9 8.6 - 10.2 mg/dL   CBC with Auto Differential   Result Value Ref Range    WBC 11.0 4.5 - 11.5 E9/L    RBC 4.64 3.80 - 5.80 E12/L    Hemoglobin 14.5 12.5 - 16.5 g/dL    Hematocrit 43.0 37.0 - 54.0 %    MCV 92.7 80.0 - 99.9 fL    MCH 31.3 26.0 - 35.0 pg    MCHC 33.7 32.0 - 34.5 %    RDW 13.2 11.5 - 15.0 fL    Platelets 386 933 - 752 E9/L    MPV 11.4 7.0 - 12.0 fL    Neutrophils % 76.5 43.0 - 80.0 %    Immature Granulocytes % 0.2 0.0 - 5.0 %    Lymphocytes % 17.1 (L) 20.0 - 42.0 %    Monocytes % 4.8 2.0 - 12.0 %    Eosinophils % 0.6 0.0 - 6.0 %    Basophils % 0.8 0.0 - 2.0 %    Neutrophils Absolute 8.37 (H) 1.80 - 7.30 E9/L    Immature Granulocytes # 0.02 E9/L    Lymphocytes Absolute 1.87 1.50 - 4.00 E9/L    Monocytes Absolute 0.53 0.10 - 0.95 E9/L    Eosinophils Absolute 0.07 0.05 - 0.50 E9/L    Basophils Absolute 0.09 0.00 - 0.20 E9/L   C-Reactive Protein   Result Value Ref Range    CRP 0.5 (H) 0.0 - 0.4 mg/dL     Imaging: All Radiology results interpreted by Radiologist unless otherwise noted. US DUP LOWER EXTREMITY RIGHT GRACIELA   Final Result   No evidence of DVT in the right lower extremity. XR KNEE RIGHT (3 VIEWS)   Final Result   No acute fracture or dislocation. ED Course / Medical Decision Making     Medications   ibuprofen (ADVIL;MOTRIN) tablet 800 mg (has no administration in time range)   cephALEXin (KEFLEX) capsule 500 mg (has no administration in time range)   sulfamethoxazole-trimethoprim (BACTRIM DS;SEPTRA DS) 800-160 MG per tablet 1 tablet (has no administration in time range)        Consult(s):   None    Procedure(s):   none. MDM:     Patient presented to the ER with nontraumatic right knee pain that started 3 to 4 days ago. Pain was worse with movement. There was a small abscess to the anterior portion of his knee. Patient is not sure how it developed. Denies fevers or chills. Differential diagnosis: Septic joint, abscess, cellulitis, orthopedic injury  X-rays of his right knee showed no acute fracture or dislocation. No joint effusion was appreciated on the x-ray either. Ultrasound of the right lower extremity showed no acute evidence of a DVT in the right lower leg. BMP, CBC and a CRP were performed. No white count elevation in the CBC. BMP was within normal limits. CRP was resulted at 0.5.   Due to these findings, I had a low suspicion of a septic joint within the right knee. However, I we will start the patient on p.o. antibiotics. He received his first dose here of 500 mg of Keflex, Bactrim DS and 800 mg ibuprofen p.o. Prescriptions were sent to the patient's pharmacy for Keflex 4 times daily for 7 days and Bactrim twice daily for 5 days. Advised patient to follow-up with his PCP this week and to return to the ER for any worsening symptoms including worsening pain, swelling, warmth, redness, difficulty ambulating or fevers. Plan of Care/Counseling:  LEYDI Tran CNP reviewed today's visit with the patient in addition to providing specific details for the plan of care and counseling regarding the diagnosis and prognosis. Questions are answered at this time and are agreeable with the plan. Assessment      1. Cellulitis and abscess of leg    2. Acute pain of right knee      Plan   Discharged home. Patient condition is stable    New Medications     New Prescriptions    CEPHALEXIN (KEFLEX) 500 MG CAPSULE    Take 1 capsule by mouth 4 times daily for 7 days    SULFAMETHOXAZOLE-TRIMETHOPRIM (BACTRIM DS) 800-160 MG PER TABLET    Take 1 tablet by mouth 2 times daily for 5 days     Electronically signed by LEYDI Tran CNP   DD: 1/9/23  **This report was transcribed using voice recognition software. Every effort was made to ensure accuracy; however, inadvertent computerized transcription errors may be present.   END OF ED PROVIDER NOTE       LEYDI Tran CNP  01/09/23 2040

## 2023-01-09 NOTE — LETTER
[facility] Emergency Department  [address] [place] [address]  Phone: [phone]               January 9, 2023    Patient: Lexx Severino   YOB: 1985   Date of Visit: 1/9/2023       To Whom It May Concern:    Lexx Severino was seen and treated in our emergency department on 1/9/2023. He may return to work 1/11/23.       Sincerely,       Percy Smalls RN         Signature:__________________________________

## 2023-01-10 NOTE — DISCHARGE INSTRUCTIONS
Take the antibiotics until they are both completed. Apply warm compresses to the abscess to draw the infection to the surface. Let it drain. Return to the ER if the redness gets worse, worsening knee swelling, fevers. Follow-up with your primary care doctor this week.

## 2023-05-08 ENCOUNTER — HOSPITAL ENCOUNTER (EMERGENCY)
Age: 38
Discharge: HOME | End: 2023-05-08
Attending: EMERGENCY MEDICINE
Payer: MEDICAID

## 2023-05-08 VITALS
RESPIRATION RATE: 16 BRPM | OXYGEN SATURATION: 99 % | HEART RATE: 72 BPM | BODY MASS INDEX: 21.95 KG/M2 | SYSTOLIC BLOOD PRESSURE: 119 MMHG | DIASTOLIC BLOOD PRESSURE: 70 MMHG | WEIGHT: 120 LBS | TEMPERATURE: 98.2 F

## 2023-05-08 DIAGNOSIS — L03.119 CELLULITIS AND ABSCESS OF LEG: Primary | ICD-10-CM

## 2023-05-08 DIAGNOSIS — L02.419 CELLULITIS AND ABSCESS OF LEG: Primary | ICD-10-CM

## 2023-05-08 PROCEDURE — 6370000000 HC RX 637 (ALT 250 FOR IP): Performed by: EMERGENCY MEDICINE

## 2023-05-08 PROCEDURE — 99284 EMERGENCY DEPT VISIT MOD MDM: CPT

## 2023-05-08 PROCEDURE — 90714 TD VACC NO PRESV 7 YRS+ IM: CPT | Performed by: EMERGENCY MEDICINE

## 2023-05-08 PROCEDURE — 90471 IMMUNIZATION ADMIN: CPT | Performed by: EMERGENCY MEDICINE

## 2023-05-08 PROCEDURE — 6360000002 HC RX W HCPCS: Performed by: EMERGENCY MEDICINE

## 2023-05-08 RX ORDER — HYDROCODONE BITARTRATE AND ACETAMINOPHEN 5; 325 MG/1; MG/1
1 TABLET ORAL ONCE
Status: COMPLETED | OUTPATIENT
Start: 2023-05-08 | End: 2023-05-08

## 2023-05-08 RX ORDER — HYDROCODONE BITARTRATE AND ACETAMINOPHEN 5; 325 MG/1; MG/1
1 TABLET ORAL EVERY 6 HOURS PRN
Qty: 6 TABLET | Refills: 0 | Status: SHIPPED | OUTPATIENT
Start: 2023-05-08 | End: 2023-05-11

## 2023-05-08 RX ORDER — TETANUS AND DIPHTHERIA TOXOIDS ADSORBED 2; 2 [LF]/.5ML; [LF]/.5ML
0.5 INJECTION INTRAMUSCULAR ONCE
Status: COMPLETED | OUTPATIENT
Start: 2023-05-08 | End: 2023-05-08

## 2023-05-08 RX ORDER — BACITRACIN, NEOMYCIN, POLYMYXIN B 400; 3.5; 5 [USP'U]/G; MG/G; [USP'U]/G
OINTMENT TOPICAL
Qty: 30 G | Refills: 0 | Status: SHIPPED | OUTPATIENT
Start: 2023-05-08 | End: 2023-05-18

## 2023-05-08 RX ORDER — SULFAMETHOXAZOLE AND TRIMETHOPRIM 800; 160 MG/1; MG/1
1 TABLET ORAL 2 TIMES DAILY
Qty: 20 TABLET | Refills: 0 | Status: SHIPPED | OUTPATIENT
Start: 2023-05-08 | End: 2023-05-18

## 2023-05-08 RX ORDER — IBUPROFEN 600 MG/1
600 TABLET ORAL EVERY 8 HOURS PRN
Qty: 15 TABLET | Refills: 0 | Status: SHIPPED | OUTPATIENT
Start: 2023-05-08 | End: 2023-05-13

## 2023-05-08 RX ORDER — SULFAMETHOXAZOLE AND TRIMETHOPRIM 800; 160 MG/1; MG/1
1 TABLET ORAL ONCE
Status: COMPLETED | OUTPATIENT
Start: 2023-05-08 | End: 2023-05-08

## 2023-05-08 RX ADMIN — SULFAMETHOXAZOLE AND TRIMETHOPRIM 1 TABLET: 800; 160 TABLET ORAL at 20:46

## 2023-05-08 RX ADMIN — TETANUS AND DIPHTHERIA TOXOIDS ADSORBED 0.5 ML: 2; 2 INJECTION INTRAMUSCULAR at 20:56

## 2023-05-08 RX ADMIN — HYDROCODONE BITARTRATE AND ACETAMINOPHEN 1 TABLET: 5; 325 TABLET ORAL at 20:46

## 2023-05-08 ASSESSMENT — PAIN - FUNCTIONAL ASSESSMENT
PAIN_FUNCTIONAL_ASSESSMENT: 0-10
PAIN_FUNCTIONAL_ASSESSMENT: NONE - DENIES PAIN

## 2023-05-08 ASSESSMENT — LIFESTYLE VARIABLES
HOW OFTEN DO YOU HAVE A DRINK CONTAINING ALCOHOL: MONTHLY OR LESS
HOW MANY STANDARD DRINKS CONTAINING ALCOHOL DO YOU HAVE ON A TYPICAL DAY: 1 OR 2

## 2023-05-08 ASSESSMENT — PAIN DESCRIPTION - PAIN TYPE: TYPE: ACUTE PAIN

## 2023-05-08 ASSESSMENT — PAIN DESCRIPTION - DESCRIPTORS
DESCRIPTORS: ACHING
DESCRIPTORS: ACHING;DISCOMFORT;SORE

## 2023-05-08 ASSESSMENT — PAIN SCALES - GENERAL
PAINLEVEL_OUTOF10: 5
PAINLEVEL_OUTOF10: 3

## 2023-05-08 ASSESSMENT — PAIN DESCRIPTION - LOCATION
LOCATION: LEG
LOCATION: LEG

## 2023-05-08 ASSESSMENT — PAIN DESCRIPTION - ORIENTATION
ORIENTATION: RIGHT;UPPER
ORIENTATION: RIGHT

## 2023-05-08 ASSESSMENT — PAIN DESCRIPTION - FREQUENCY: FREQUENCY: CONTINUOUS

## 2023-05-08 ASSESSMENT — PAIN DESCRIPTION - ONSET: ONSET: ON-GOING

## 2023-05-08 NOTE — ED PROVIDER NOTES
HPI:  5/8/23, Time: 7:44 PM EDT        Vivi Frazier is a 40 y.o. male presenting to the ED for abscess and drainage from the right thigh with redness, beginning 4-5 days ago. The complaint has been persistent, moderate in severity, and worsened by nothing. The tablet  system was utilized for communication with the patient ([employer]). Patient's not had any fever/chills, no history of any other complaints at all associated. States that the redness of the wound has progressed over the past 2 days and that the wound spontaneously drained purulent material starting 2 days ago. No relieving factors are reported. No calf pain or any other areas of pain reported. Review of Systems:   A complete review of systems was performed and pertinent positives and negatives are stated within HPI, all other systems reviewed and are negative.    --------------------------------------------- PAST HISTORY ---------------------------------------------  Past Medical History:  has a past medical history of VATER syndrome. Past Surgical History:  has a past surgical history that includes Tibia fracture surgery (Right, 06/01/2021); tracheostomy; and Colon surgery. Social History:  reports that he has been smoking cigarettes. He has a 1.00 pack-year smoking history. He has never used smokeless tobacco. He reports current alcohol use. He reports that he does not use drugs. Family History: family history includes Asthma in his mother. The patients home medications have been reviewed. Allergies: Demerol hcl [meperidine]    -------------------------------------------------- RESULTS -------------------------------------------------  All laboratory and radiology results have been personally reviewed by asaelelf   LABS:  No results found for this visit on 05/08/23.     RADIOLOGY:  Interpreted by Radiologist.  No orders to display       ------------------------- NURSING NOTES AND VITALS

## 2023-05-09 NOTE — ED NOTES
Rt upper thigh wound cleansed with wound cleanser and bacitracin dressing applied. Utilized  for communication.       Bibiana Cruz RN  05/08/23 [id number]

## 2023-05-09 NOTE — ED NOTES
Patient assessed using . Approximately one week ago noted small area of reddness Rt inner thigh. No known injury . Denies using injectable drugs. Since yesterday area has gotten larger and is more painful and is draining yellow pus. Denies any known injury or insect bite either. Area that is open is circular and approximately one cm in diameter. Reddness surrounding the wound is approximately 3.5 cm in diameter. Area warm to touch.       Adore Parrish RN  05/08/23 2011

## 2023-10-11 ENCOUNTER — APPOINTMENT (OUTPATIENT)
Dept: GENERAL RADIOLOGY | Age: 38
End: 2023-10-11
Payer: MEDICAID

## 2023-10-11 ENCOUNTER — HOSPITAL ENCOUNTER (EMERGENCY)
Age: 38
Discharge: HOME | End: 2023-10-11
Attending: EMERGENCY MEDICINE
Payer: MEDICAID

## 2023-10-11 ENCOUNTER — APPOINTMENT (OUTPATIENT)
Dept: CT IMAGING | Age: 38
End: 2023-10-11
Payer: MEDICAID

## 2023-10-11 VITALS
HEART RATE: 55 BPM | DIASTOLIC BLOOD PRESSURE: 59 MMHG | SYSTOLIC BLOOD PRESSURE: 103 MMHG | WEIGHT: 117.8 LBS | TEMPERATURE: 98.1 F | BODY MASS INDEX: 21.55 KG/M2 | RESPIRATION RATE: 16 BRPM | OXYGEN SATURATION: 98 %

## 2023-10-11 DIAGNOSIS — R74.8 ELEVATED LIPASE: ICD-10-CM

## 2023-10-11 DIAGNOSIS — R07.89 ATYPICAL CHEST PAIN: Primary | ICD-10-CM

## 2023-10-11 LAB
ALBUMIN SERPL-MCNC: 4.2 G/DL (ref 3.5–5.2)
ALP SERPL-CCNC: 64 U/L (ref 40–129)
ALT SERPL-CCNC: 11 U/L (ref 0–40)
ANION GAP SERPL CALCULATED.3IONS-SCNC: 9 MMOL/L (ref 7–16)
AST SERPL-CCNC: 27 U/L (ref 0–39)
BASOPHILS # BLD: 0.08 K/UL (ref 0–0.2)
BASOPHILS NFR BLD: 1 % (ref 0–2)
BILIRUB SERPL-MCNC: 0.6 MG/DL (ref 0–1.2)
BILIRUB UR QL STRIP: NEGATIVE
BUN SERPL-MCNC: 10 MG/DL (ref 6–20)
CALCIUM SERPL-MCNC: 9.5 MG/DL (ref 8.6–10.2)
CHLORIDE SERPL-SCNC: 102 MMOL/L (ref 98–107)
CLARITY UR: CLEAR
CO2 SERPL-SCNC: 28 MMOL/L (ref 22–29)
COLOR UR: YELLOW
COMMENT: ABNORMAL
CREAT SERPL-MCNC: 0.8 MG/DL (ref 0.7–1.2)
D DIMER: <200 NG/ML DDU (ref 0–232)
EKG ATRIAL RATE: 71 BPM
EKG P AXIS: 61 DEGREES
EKG P-R INTERVAL: 130 MS
EKG Q-T INTERVAL: 386 MS
EKG QRS DURATION: 86 MS
EKG QTC CALCULATION (BAZETT): 419 MS
EKG R AXIS: -22 DEGREES
EKG T AXIS: 43 DEGREES
EKG VENTRICULAR RATE: 71 BPM
EOSINOPHIL # BLD: 0.11 K/UL (ref 0.05–0.5)
EOSINOPHILS RELATIVE PERCENT: 2 % (ref 0–6)
ERYTHROCYTE [DISTWIDTH] IN BLOOD BY AUTOMATED COUNT: 12.7 % (ref 11.5–15)
GFR SERPL CREATININE-BSD FRML MDRD: >60 ML/MIN/1.73M2
GLUCOSE SERPL-MCNC: 77 MG/DL (ref 74–99)
GLUCOSE UR STRIP-MCNC: NEGATIVE MG/DL
HCT VFR BLD AUTO: 39.5 % (ref 37–54)
HGB BLD-MCNC: 13.3 G/DL (ref 12.5–16.5)
HGB UR QL STRIP.AUTO: NEGATIVE
IMM GRANULOCYTES # BLD AUTO: 0.03 K/UL (ref 0–0.58)
IMM GRANULOCYTES NFR BLD: 0 % (ref 0–5)
KETONES UR STRIP-MCNC: NEGATIVE MG/DL
LACTATE BLDV-SCNC: 0.9 MMOL/L (ref 0.5–2.2)
LEUKOCYTE ESTERASE UR QL STRIP: NEGATIVE
LIPASE SERPL-CCNC: 98 U/L (ref 13–60)
LYMPHOCYTES NFR BLD: 2.04 K/UL (ref 1.5–4)
LYMPHOCYTES RELATIVE PERCENT: 30 % (ref 20–42)
MCH RBC QN AUTO: 31.2 PG (ref 26–35)
MCHC RBC AUTO-ENTMCNC: 33.7 G/DL (ref 32–34.5)
MCV RBC AUTO: 92.7 FL (ref 80–99.9)
MONOCYTES NFR BLD: 0.51 K/UL (ref 0.1–0.95)
MONOCYTES NFR BLD: 7 % (ref 2–12)
NEUTROPHILS NFR BLD: 60 % (ref 43–80)
NEUTS SEG NFR BLD: 4.1 K/UL (ref 1.8–7.3)
NITRITE UR QL STRIP: NEGATIVE
PH UR STRIP: 7.5 [PH] (ref 5–9)
PLATELET # BLD AUTO: 204 K/UL (ref 130–450)
PMV BLD AUTO: 11.4 FL (ref 7–12)
POTASSIUM SERPL-SCNC: 3.8 MMOL/L (ref 3.5–5)
PROT SERPL-MCNC: 7.3 G/DL (ref 6.4–8.3)
PROT UR STRIP-MCNC: NEGATIVE MG/DL
RBC # BLD AUTO: 4.26 M/UL (ref 3.8–5.8)
SODIUM SERPL-SCNC: 139 MMOL/L (ref 132–146)
SP GR UR STRIP: <1.005 (ref 1–1.03)
TROPONIN I SERPL HS-MCNC: 6 NG/L (ref 0–11)
TROPONIN I SERPL HS-MCNC: 7 NG/L (ref 0–11)
UROBILINOGEN UR STRIP-ACNC: 0.2 EU/DL (ref 0–1)
WBC OTHER # BLD: 6.9 K/UL (ref 4.5–11.5)

## 2023-10-11 PROCEDURE — 81003 URINALYSIS AUTO W/O SCOPE: CPT

## 2023-10-11 PROCEDURE — 6360000004 HC RX CONTRAST MEDICATION: Performed by: RADIOLOGY

## 2023-10-11 PROCEDURE — 85025 COMPLETE CBC W/AUTO DIFF WBC: CPT

## 2023-10-11 PROCEDURE — 83605 ASSAY OF LACTIC ACID: CPT

## 2023-10-11 PROCEDURE — 6370000000 HC RX 637 (ALT 250 FOR IP): Performed by: EMERGENCY MEDICINE

## 2023-10-11 PROCEDURE — 84484 ASSAY OF TROPONIN QUANT: CPT

## 2023-10-11 PROCEDURE — 80053 COMPREHEN METABOLIC PANEL: CPT

## 2023-10-11 PROCEDURE — 83690 ASSAY OF LIPASE: CPT

## 2023-10-11 PROCEDURE — 99285 EMERGENCY DEPT VISIT HI MDM: CPT

## 2023-10-11 PROCEDURE — 74177 CT ABD & PELVIS W/CONTRAST: CPT

## 2023-10-11 PROCEDURE — 93010 ELECTROCARDIOGRAM REPORT: CPT | Performed by: INTERNAL MEDICINE

## 2023-10-11 PROCEDURE — 93005 ELECTROCARDIOGRAM TRACING: CPT | Performed by: EMERGENCY MEDICINE

## 2023-10-11 PROCEDURE — 85379 FIBRIN DEGRADATION QUANT: CPT

## 2023-10-11 PROCEDURE — 71045 X-RAY EXAM CHEST 1 VIEW: CPT

## 2023-10-11 RX ORDER — MAGNESIUM HYDROXIDE/ALUMINUM HYDROXICE/SIMETHICONE 120; 1200; 1200 MG/30ML; MG/30ML; MG/30ML
30 SUSPENSION ORAL ONCE
Status: COMPLETED | OUTPATIENT
Start: 2023-10-11 | End: 2023-10-11

## 2023-10-11 RX ADMIN — IOPAMIDOL 75 ML: 755 INJECTION, SOLUTION INTRAVENOUS at 16:21

## 2023-10-11 RX ADMIN — ALUMINUM HYDROXIDE, MAGNESIUM HYDROXIDE, AND SIMETHICONE 30 ML: 200; 200; 20 SUSPENSION ORAL at 15:01

## 2023-10-11 ASSESSMENT — PAIN SCALES - GENERAL: PAINLEVEL_OUTOF10: 5

## 2023-10-11 ASSESSMENT — PAIN DESCRIPTION - DESCRIPTORS: DESCRIPTORS: ACHING;DULL;DISCOMFORT

## 2023-10-11 ASSESSMENT — PAIN DESCRIPTION - LOCATION: LOCATION: CHEST

## 2023-10-11 NOTE — ED NOTES
Linda Bear with lab called to advise a redrw is needed for the blue top d-dimer stating \"it was short\" JLUIS WADE notified.       Lupillo Ramires  10/11/23 [id number]

## 2024-07-01 ENCOUNTER — OFFICE VISIT (OUTPATIENT)
Dept: INTERNAL MEDICINE | Age: 39
End: 2024-07-01
Payer: MEDICAID

## 2024-07-01 VITALS
DIASTOLIC BLOOD PRESSURE: 66 MMHG | RESPIRATION RATE: 18 BRPM | WEIGHT: 124.6 LBS | BODY MASS INDEX: 22.93 KG/M2 | HEART RATE: 53 BPM | SYSTOLIC BLOOD PRESSURE: 107 MMHG | HEIGHT: 62 IN | OXYGEN SATURATION: 95 %

## 2024-07-01 DIAGNOSIS — K21.9 GASTROESOPHAGEAL REFLUX DISEASE WITHOUT ESOPHAGITIS: ICD-10-CM

## 2024-07-01 DIAGNOSIS — N53.19 DISORDER OF EJACULATION: ICD-10-CM

## 2024-07-01 DIAGNOSIS — Q87.2 VATER SYNDROME: Primary | ICD-10-CM

## 2024-07-01 PROCEDURE — 99203 OFFICE O/P NEW LOW 30 MIN: CPT

## 2024-07-01 RX ORDER — FOLIC ACID 1 MG/1
1 TABLET ORAL DAILY
Qty: 90 TABLET | Refills: 1 | Status: SHIPPED | OUTPATIENT
Start: 2024-07-01

## 2024-07-01 RX ORDER — PANTOPRAZOLE SODIUM 40 MG/1
40 TABLET, DELAYED RELEASE ORAL
Qty: 90 TABLET | Refills: 1 | Status: SHIPPED | OUTPATIENT
Start: 2024-07-01

## 2024-07-01 SDOH — ECONOMIC STABILITY: FOOD INSECURITY: WITHIN THE PAST 12 MONTHS, YOU WORRIED THAT YOUR FOOD WOULD RUN OUT BEFORE YOU GOT MONEY TO BUY MORE.: NEVER TRUE

## 2024-07-01 SDOH — ECONOMIC STABILITY: FOOD INSECURITY: WITHIN THE PAST 12 MONTHS, THE FOOD YOU BOUGHT JUST DIDN'T LAST AND YOU DIDN'T HAVE MONEY TO GET MORE.: NEVER TRUE

## 2024-07-01 SDOH — ECONOMIC STABILITY: HOUSING INSECURITY
IN THE LAST 12 MONTHS, WAS THERE A TIME WHEN YOU DID NOT HAVE A STEADY PLACE TO SLEEP OR SLEPT IN A SHELTER (INCLUDING NOW)?: NO

## 2024-07-01 SDOH — ECONOMIC STABILITY: INCOME INSECURITY: HOW HARD IS IT FOR YOU TO PAY FOR THE VERY BASICS LIKE FOOD, HOUSING, MEDICAL CARE, AND HEATING?: NOT HARD AT ALL

## 2024-07-01 ASSESSMENT — PATIENT HEALTH QUESTIONNAIRE - PHQ9
SUM OF ALL RESPONSES TO PHQ QUESTIONS 1-9: 0
1. LITTLE INTEREST OR PLEASURE IN DOING THINGS: NOT AT ALL
2. FEELING DOWN, DEPRESSED OR HOPELESS: NOT AT ALL
SUM OF ALL RESPONSES TO PHQ9 QUESTIONS 1 & 2: 0

## 2024-07-01 NOTE — PROGRESS NOTES
[facility]  Internal Medicine Residency Clinic    Attending Physician Statement  I have discussed the case, including pertinent history and exam findings with the resident physician.I have seen and examined the patient and the key elements of the encounter have been performed by me. I agree with the assessment, plan and orders as documented by the resident. I have reviewed all pertinent PMHx, PSHx, FamHx, SocialHx, medications, and allergies and updated history as appropriate.    Patient here for routine follow up of medical problems.     VATERL Syndrome   -multiple surgeries; currently only has lasting GERD   -PPI ordered     Anejaculation  -referral to Urology for evluation; parag able to obtain erection; parag has had evaluation of sperm in past but wishes for evalutaion   -parag also has difficulty urinating   -consider referral to fertility specialist in future     HCM  -patient defers vaccinations or screening labs     Remainder of medical problems as per resident note.    Jose Benites Jr, DO  7/1/24

## 2024-07-01 NOTE — ASSESSMENT & PLAN NOTE
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

## 2024-07-01 NOTE — PROGRESS NOTES
[facility] Physicians - [facility] Internal Medicine      SUBJECTIVE:  Angel English (:  1985) is a 39 y.o. 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     Back pain comes and goes for many years, dependant on the movement. Mild relief with Ibuprofen. No pain on flexion or extension. No pain on palpation.     GERD- Previously prescribed syntax for acid reflux, heart burn dependant on what he eats. Feels reflux if he goes to lay down right after eating, on and off, gets better by itself without medication.     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 He has never ejaculated before. 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 His Gf and him both want children. He was previously told he may have to consider artifical insemination. Refer to urologist for further evaluation.     HCM: Does not want to get his heptatis B vaccine or COVID vaccine        Sister has hyperglycemia    Past Medical History:   Diagnosis Date    VATER syndrome     Vater syndrome- has had 11 surgeries  - Corrected tracheoesophageal fistula -corrected imperforate anus -horshoe kidneys - Deviation in the spine - Corrected hydronephrosis - Polydiastyly - Mucosa prophylax - Corrected fistula of the bladder - Congenital kyphosis - Corrected obstructive uropathy - Spondylolisthesis - Corrected hydrocele        Past Surgical History:   Procedure Laterality Date

## 2024-10-06 ENCOUNTER — HOSPITAL ENCOUNTER (EMERGENCY)
Age: 39
Discharge: HOME | End: 2024-10-06
Attending: STUDENT IN AN ORGANIZED HEALTH CARE EDUCATION/TRAINING PROGRAM
Payer: MEDICAID

## 2024-10-06 ENCOUNTER — APPOINTMENT (OUTPATIENT)
Dept: CT IMAGING | Age: 39
End: 2024-10-06
Payer: MEDICAID

## 2024-10-06 VITALS
TEMPERATURE: 98.4 F | DIASTOLIC BLOOD PRESSURE: 71 MMHG | RESPIRATION RATE: 16 BRPM | BODY MASS INDEX: 20.24 KG/M2 | WEIGHT: 110 LBS | HEIGHT: 62 IN | SYSTOLIC BLOOD PRESSURE: 116 MMHG | HEART RATE: 84 BPM | OXYGEN SATURATION: 98 %

## 2024-10-06 DIAGNOSIS — R31.9 HEMATURIA, UNSPECIFIED TYPE: Primary | ICD-10-CM

## 2024-10-06 DIAGNOSIS — N32.3 DIVERTICULA, BLADDER: ICD-10-CM

## 2024-10-06 LAB
ALBUMIN SERPL-MCNC: 4.1 G/DL (ref 3.5–5.2)
ALP SERPL-CCNC: 86 U/L (ref 40–129)
ALT SERPL-CCNC: 10 U/L (ref 0–40)
ANION GAP SERPL CALCULATED.3IONS-SCNC: 13 MMOL/L (ref 7–16)
AST SERPL-CCNC: 16 U/L (ref 0–39)
BASOPHILS # BLD: 0.04 K/UL (ref 0–0.2)
BASOPHILS NFR BLD: 1 % (ref 0–2)
BILIRUB SERPL-MCNC: 0.2 MG/DL (ref 0–1.2)
BILIRUB UR QL STRIP: NEGATIVE
BUN SERPL-MCNC: 12 MG/DL (ref 6–20)
CALCIUM SERPL-MCNC: 9.1 MG/DL (ref 8.6–10.2)
CHLORIDE SERPL-SCNC: 101 MMOL/L (ref 98–107)
CLARITY UR: CLEAR
CO2 SERPL-SCNC: 27 MMOL/L (ref 22–29)
COLOR UR: YELLOW
CREAT SERPL-MCNC: 0.8 MG/DL (ref 0.7–1.2)
EOSINOPHIL # BLD: 0.21 K/UL (ref 0.05–0.5)
EOSINOPHILS RELATIVE PERCENT: 3 % (ref 0–6)
ERYTHROCYTE [DISTWIDTH] IN BLOOD BY AUTOMATED COUNT: 12.2 % (ref 11.5–15)
GFR, ESTIMATED: >90 ML/MIN/1.73M2
GLUCOSE SERPL-MCNC: 96 MG/DL (ref 74–99)
GLUCOSE UR STRIP-MCNC: NEGATIVE MG/DL
HCT VFR BLD AUTO: 38.3 % (ref 37–54)
HGB BLD-MCNC: 13.3 G/DL (ref 12.5–16.5)
HGB UR QL STRIP.AUTO: NEGATIVE
IMM GRANULOCYTES # BLD AUTO: <0.03 K/UL (ref 0–0.58)
IMM GRANULOCYTES NFR BLD: 0 % (ref 0–5)
KETONES UR STRIP-MCNC: NEGATIVE MG/DL
LEUKOCYTE ESTERASE UR QL STRIP: NEGATIVE
LYMPHOCYTES NFR BLD: 1.97 K/UL (ref 1.5–4)
LYMPHOCYTES RELATIVE PERCENT: 29 % (ref 20–42)
MCH RBC QN AUTO: 30.4 PG (ref 26–35)
MCHC RBC AUTO-ENTMCNC: 34.7 G/DL (ref 32–34.5)
MCV RBC AUTO: 87.4 FL (ref 80–99.9)
MONOCYTES NFR BLD: 0.4 K/UL (ref 0.1–0.95)
MONOCYTES NFR BLD: 6 % (ref 2–12)
NEUTROPHILS NFR BLD: 62 % (ref 43–80)
NEUTS SEG NFR BLD: 4.21 K/UL (ref 1.8–7.3)
NITRITE UR QL STRIP: NEGATIVE
PH UR STRIP: 6 [PH] (ref 5–9)
PLATELET # BLD AUTO: 295 K/UL (ref 130–450)
PMV BLD AUTO: 11.1 FL (ref 7–12)
POTASSIUM SERPL-SCNC: 3.6 MMOL/L (ref 3.5–5)
PROT SERPL-MCNC: 7 G/DL (ref 6.4–8.3)
PROT UR STRIP-MCNC: NEGATIVE MG/DL
RBC # BLD AUTO: 4.38 M/UL (ref 3.8–5.8)
RBC #/AREA URNS HPF: ABNORMAL /HPF
SODIUM SERPL-SCNC: 141 MMOL/L (ref 132–146)
SP GR UR STRIP: >1.03 (ref 1–1.03)
UROBILINOGEN UR STRIP-ACNC: 0.2 EU/DL (ref 0–1)
WBC #/AREA URNS HPF: ABNORMAL /HPF
WBC OTHER # BLD: 6.8 K/UL (ref 4.5–11.5)

## 2024-10-06 PROCEDURE — 81001 URINALYSIS AUTO W/SCOPE: CPT

## 2024-10-06 PROCEDURE — 74177 CT ABD & PELVIS W/CONTRAST: CPT

## 2024-10-06 PROCEDURE — 6360000002 HC RX W HCPCS: Performed by: STUDENT IN AN ORGANIZED HEALTH CARE EDUCATION/TRAINING PROGRAM

## 2024-10-06 PROCEDURE — 80053 COMPREHEN METABOLIC PANEL: CPT

## 2024-10-06 PROCEDURE — 85025 COMPLETE CBC W/AUTO DIFF WBC: CPT

## 2024-10-06 PROCEDURE — 99285 EMERGENCY DEPT VISIT HI MDM: CPT

## 2024-10-06 PROCEDURE — 96374 THER/PROPH/DIAG INJ IV PUSH: CPT

## 2024-10-06 PROCEDURE — 6360000004 HC RX CONTRAST MEDICATION: Performed by: RADIOLOGY

## 2024-10-06 RX ORDER — KETOROLAC TROMETHAMINE 30 MG/ML
15 INJECTION, SOLUTION INTRAMUSCULAR; INTRAVENOUS ONCE
Status: COMPLETED | OUTPATIENT
Start: 2024-10-06 | End: 2024-10-06

## 2024-10-06 RX ORDER — IOPAMIDOL 755 MG/ML
75 INJECTION, SOLUTION INTRAVASCULAR
Status: COMPLETED | OUTPATIENT
Start: 2024-10-06 | End: 2024-10-06

## 2024-10-06 RX ADMIN — IOPAMIDOL 75 ML: 755 INJECTION, SOLUTION INTRAVENOUS at 21:09

## 2024-10-06 RX ADMIN — KETOROLAC TROMETHAMINE 15 MG: 30 INJECTION, SOLUTION INTRAMUSCULAR at 22:34

## 2024-10-06 ASSESSMENT — PAIN DESCRIPTION - LOCATION: LOCATION: ABDOMEN

## 2024-10-06 ASSESSMENT — PAIN - FUNCTIONAL ASSESSMENT: PAIN_FUNCTIONAL_ASSESSMENT: NONE - DENIES PAIN

## 2024-10-06 ASSESSMENT — PAIN SCALES - GENERAL: PAINLEVEL_OUTOF10: 5

## 2024-10-06 ASSESSMENT — PAIN DESCRIPTION - DESCRIPTORS: DESCRIPTORS: ACHING;DULL

## 2024-10-06 ASSESSMENT — PAIN DESCRIPTION - ORIENTATION: ORIENTATION: LOWER

## 2024-10-07 NOTE — ED PROVIDER NOTES
HPI   This is a 39-year-old male patient presents emergency department for evaluation of urinary frequency.  Ongoing for about a week and a half today he did notice some pain with urination and he had a little bit of blood in his urine.  He denies any new sexual encounters, he is only sexually active with 1 partner.  He has no concerns for STDs.  He denies any penile discharge.  He does note a small area of redness and swelling to the suprapubic region that spontaneously drained and popped on its own few days ago.  He is not diabetic  Review of Systems   Constitutional:  Negative for chills and fever.   HENT:  Negative for ear pain, sinus pressure and sore throat.    Eyes:  Negative for pain, discharge and redness.   Respiratory:  Negative for cough, shortness of breath and wheezing.    Cardiovascular:  Negative for chest pain.   Gastrointestinal:  Negative for abdominal pain, diarrhea, nausea and vomiting.   Genitourinary:  Positive for frequency and hematuria. Negative for dysuria.   Musculoskeletal:  Negative for arthralgias and back pain.   Skin:  Negative for rash and wound.   Neurological:  Negative for weakness and headaches.   Hematological:  Negative for adenopathy.   All other systems reviewed and are negative.       Physical Exam  Vitals and nursing note reviewed.   Constitutional:       Appearance: He is well-developed.   HENT:      Head: Normocephalic and atraumatic.      Nose: Nose normal.      Mouth/Throat:      Pharynx: Oropharynx is clear.   Eyes:      Conjunctiva/sclera: Conjunctivae normal.   Cardiovascular:      Rate and Rhythm: Normal rate and regular rhythm.      Heart sounds: Normal heart sounds. No murmur heard.  Pulmonary:      Effort: Pulmonary effort is normal. No respiratory distress.      Breath sounds: Normal breath sounds.   Abdominal:      Palpations: Abdomen is soft.      Tenderness: There is no abdominal tenderness. There is no guarding or rebound.   Musculoskeletal:

## 2024-10-22 ENCOUNTER — OFFICE VISIT (OUTPATIENT)
Dept: INTERNAL MEDICINE | Age: 39
End: 2024-10-22
Payer: MEDICAID

## 2024-10-22 VITALS
TEMPERATURE: 97.9 F | OXYGEN SATURATION: 98 % | BODY MASS INDEX: 21.35 KG/M2 | SYSTOLIC BLOOD PRESSURE: 102 MMHG | HEIGHT: 62 IN | RESPIRATION RATE: 16 BRPM | HEART RATE: 64 BPM | DIASTOLIC BLOOD PRESSURE: 80 MMHG | WEIGHT: 116 LBS

## 2024-10-22 DIAGNOSIS — Q87.2 VATER SYNDROME: ICD-10-CM

## 2024-10-22 DIAGNOSIS — M41.85 OTHER FORM OF SCOLIOSIS OF THORACOLUMBAR SPINE: ICD-10-CM

## 2024-10-22 DIAGNOSIS — R53.82 CHRONIC FATIGUE: ICD-10-CM

## 2024-10-22 DIAGNOSIS — G95.20 SPINAL CORD COMPRESSION (HCC): Primary | ICD-10-CM

## 2024-10-22 PROCEDURE — 99212 OFFICE O/P EST SF 10 MIN: CPT

## 2024-10-22 PROCEDURE — 99213 OFFICE O/P EST LOW 20 MIN: CPT

## 2024-10-22 RX ORDER — LIDOCAINE 50 MG/G
1 PATCH TOPICAL DAILY
Qty: 10 PATCH | Refills: 0 | Status: SHIPPED | OUTPATIENT
Start: 2024-10-22 | End: 2024-11-01

## 2024-10-22 NOTE — PATIENT INSTRUCTIONS
Dear Angel English,    Thank you for coming to your appointment today. I hope we have addressed all of your needs.     Please make sure to do the following:  - Continue your medications as listed.  - Get labs drawn before our next follow up.  - Referrals have been made to Pain management and Physical therapy. If you do not hear from the office in 1 week, please call the number listed.  - We will see each other again in 4 months    Call for a sooner appointment if you develop any new or worsening symptoms.    Have a great day!    Sincerely,  Clare Bullock MD  10/22/2024  2:30 PM

## 2024-10-22 NOTE — PROGRESS NOTES
[facility]  Internal Medicine Residency Clinic    Attending Physician Statement  I have discussed the case, including pertinent history and exam findings with the resident physician.I have seen and examined the patient and the key elements of the encounter have been performed by me. I agree with the assessment, plan and orders as documented by the resident. I have reviewed all pertinent PMHx, PSHx, FamHx, SocialHx, medications, and allergies and updated history as appropriate.    Patient here for routine follow up of medical problems.  Patient has hx VATER syndrome,  with multiple surgeries as noted, mainly c/o mid-low back pain. States that there is nothing that he has taken that has provided relief. He has no radicular pain, numbness, paresthesia, weakness of lower extremities. Denies any problems with bowels or bladder, no incontinence. On exam, patient has 5/5 motor strength, and no sensory deficits to light touch, pressure. CT of abdomen revealed severe focal kyphosis T12-L2, compression deformity of T12 and fusion of L1 and 2. Agree with referral to physical therapy, pain management and can add lidocaine patch.     Remainder of medical problems as per resident note.    Leo Medina, DO  10/22/24    
AMN  #[id number] used to interpret this session  
Activity       Sexually Active  Not Asked                 .  Review of Systems   Constitutional:  Positive for activity change and fatigue. Negative for appetite change, chills and unexpected weight change.   HENT:  Negative for ear discharge, ear pain, facial swelling, mouth sores, nosebleeds, rhinorrhea, sinus pressure, sinus pain, sneezing and sore throat.    Eyes:  Negative for pain, discharge, redness and itching.   Respiratory:  Negative for cough, chest tightness, shortness of breath and wheezing.    Cardiovascular:  Negative for chest pain, palpitations and leg swelling.   Gastrointestinal:  Negative for abdominal distention, abdominal pain, blood in stool, constipation, diarrhea, nausea and vomiting.   Genitourinary:  Negative for difficulty urinating, enuresis, flank pain and hematuria.   Musculoskeletal:  Positive for back pain and myalgias. Negative for gait problem, joint swelling, neck pain and neck stiffness.   Neurological:  Negative for dizziness, tremors, weakness, numbness and headaches.   Psychiatric/Behavioral:  Negative for agitation and behavioral problems.        Current Outpatient Medications on File Prior to Visit   Medication Sig Dispense Refill    folic acid (FOLVITE) 1 MG tablet Take 1 tablet by mouth daily 90 tablet 1    pantoprazole (PROTONIX) 40 MG tablet Take 1 tablet by mouth every morning (before breakfast) 90 tablet 1    ibuprofen (ADVIL;MOTRIN) 600 MG tablet Take 1 tablet by mouth every 8 hours as needed for Pain 15 tablet 0     No current facility-administered medications on file prior to visit.       OBJECTIVE:    VS:   Vitals:    10/22/24 1328 10/22/24 1335   BP: (!) 88/62 102/80   Site: Right Upper Arm Right Upper Arm   Position: Sitting Standing   Cuff Size: Medium Adult Medium Adult   Pulse: 62 64   Resp: 16 16   Temp: 97.9 °F (36.6 °C)    TempSrc: Temporal    SpO2: 98% 98%   Weight: 52.6 kg (116 lb)    Height: 1.575 m (5' 2\")        Physical Exam  HENT:      Head:

## 2024-10-24 ENCOUNTER — HOSPITAL ENCOUNTER (OUTPATIENT)
Age: 39
Discharge: HOME | End: 2024-10-24
Payer: MEDICAID

## 2024-10-24 DIAGNOSIS — R53.82 CHRONIC FATIGUE: ICD-10-CM

## 2024-10-24 LAB
25(OH)D3 SERPL-MCNC: 17.9 NG/ML (ref 30–100)
FOLATE SERPL-MCNC: 14.7 NG/ML (ref 4.8–24.2)
TSH SERPL DL<=0.05 MIU/L-ACNC: 2.74 UIU/ML (ref 0.27–4.2)
VIT B12 SERPL-MCNC: 835 PG/ML (ref 211–946)

## 2024-10-24 PROCEDURE — 82306 VITAMIN D 25 HYDROXY: CPT

## 2024-10-24 PROCEDURE — 82607 VITAMIN B-12: CPT

## 2024-10-24 PROCEDURE — 36415 COLL VENOUS BLD VENIPUNCTURE: CPT

## 2024-10-24 PROCEDURE — 82746 ASSAY OF FOLIC ACID SERUM: CPT

## 2024-10-24 PROCEDURE — 84443 ASSAY THYROID STIM HORMONE: CPT

## 2025-02-10 ENCOUNTER — OFFICE VISIT (OUTPATIENT)
Dept: INTERNAL MEDICINE | Age: 40
End: 2025-02-10
Payer: MEDICAID

## 2025-02-10 VITALS
HEIGHT: 62 IN | TEMPERATURE: 97.7 F | DIASTOLIC BLOOD PRESSURE: 70 MMHG | BODY MASS INDEX: 22.82 KG/M2 | HEART RATE: 65 BPM | RESPIRATION RATE: 18 BRPM | OXYGEN SATURATION: 97 % | SYSTOLIC BLOOD PRESSURE: 105 MMHG | WEIGHT: 124 LBS
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
## 2025-02-10 DIAGNOSIS — E55.9 VITAMIN D DEFICIENCY: ICD-10-CM

## 2025-02-10 DIAGNOSIS — M41.85 OTHER FORM OF SCOLIOSIS OF THORACOLUMBAR SPINE: Primary | ICD-10-CM

## 2025-02-10 DIAGNOSIS — Q87.2 VATER SYNDROME: ICD-10-CM

## 2025-02-10 DIAGNOSIS — M54.50 LUMBAR BACK PAIN: ICD-10-CM

## 2025-02-10 DIAGNOSIS — K21.9 GASTROESOPHAGEAL REFLUX DISEASE WITHOUT ESOPHAGITIS: ICD-10-CM

## 2025-02-10 PROCEDURE — 99212 OFFICE O/P EST SF 10 MIN: CPT

## 2025-02-10 PROCEDURE — 99214 OFFICE O/P EST MOD 30 MIN: CPT

## 2025-02-10 RX ORDER — ERGOCALCIFEROL 1.25 MG/1
50000 CAPSULE, LIQUID FILLED ORAL WEEKLY
Qty: 8 CAPSULE | Refills: 0 | Status: SHIPPED | OUTPATIENT
Start: 2025-02-10 | End: 2025-04-01

## 2025-02-10 RX ORDER — CHOLECALCIFEROL (VITAMIN D3) 25 MCG
1000 TABLET ORAL DAILY
Qty: 30 TABLET | Refills: 3 | Status: SHIPPED | OUTPATIENT
Start: 2025-02-10

## 2025-02-10 RX ORDER — FOLIC ACID 1 MG/1
1 TABLET ORAL DAILY
Qty: 90 TABLET | Refills: 1 | Status: SHIPPED | OUTPATIENT
Start: 2025-02-10

## 2025-02-10 RX ORDER — TIZANIDINE 2 MG/1
2 TABLET ORAL 2 TIMES DAILY
Qty: 28 TABLET | Refills: 0 | Status: SHIPPED | OUTPATIENT
Start: 2025-02-10 | End: 2025-02-24

## 2025-02-10 RX ORDER — PANTOPRAZOLE SODIUM 40 MG/1
40 TABLET, DELAYED RELEASE ORAL
Qty: 90 TABLET | Refills: 1 | Status: SHIPPED | OUTPATIENT
Start: 2025-02-10

## 2025-02-10 ASSESSMENT — ENCOUNTER SYMPTOMS
ABDOMINAL DISTENTION: 0
COLOR CHANGE: 0
EYE PAIN: 0
EYE REDNESS: 0
ANAL BLEEDING: 0
COUGH: 0
CHOKING: 0
SHORTNESS OF BREATH: 0
BACK PAIN: 1
EYE DISCHARGE: 0
APNEA: 0
CHEST TIGHTNESS: 0
EYE ITCHING: 0
BLOOD IN STOOL: 0
ABDOMINAL PAIN: 0
FACIAL SWELLING: 0

## 2025-02-10 NOTE — PROGRESS NOTES
[facility] Physicians - [facility] Internal Medicine      SUBJECTIVE:  Angel English (:  1985) is a 39 y.o. male here for evaluation of the following chief complaint(s):  Follow-up, Fall, and Back Pain (Pt states he fell and hurt his back about 3 weeks ago. Pt states pain is constant 8/10 but gets worse.)      ROSALBA Ortiz is a 39 year old male with past medical history of Vater syndrome, GERD, chronic lumbar back pain and erectile dysfunction presents to the [facility] for for regular follow up and with chief complaint of back pain secondary to fall.     Lumbar Back pain due to fall   reported lower lumbar back pain which worsened due to fall on ice 3 weeks ago. He states pain is localized but progressively improving over the last 3 weeks. Pain is mildly relieved with Ibuprofen and Lidocaine patch. Physical examination is evident for mild bruising over lumbar spine. He does not report any difficulty with movement.   - Follow Xray Thoracic and Lumbar spine to rule out any fracture   - Prescribed Tizanidine 2 mg twice daily         Chronic lumbar Back pain   Previously reported severe lower back pain ongoing for many years, worse with prolonged stanging and movement, with only mild relief with Ibuprofen. Extensive work up done at prior visits with FM, including US, Urine culture and X ray. X ray of lumbar spine in  showed acute kyphosis between T12 and L2, secondary to block L1-2 vertebrae and a significant wedge-shaped compression involving the T12 vertebral body.  There may be lor vertebra present at T12. There was also evidence of mild thoracolumbar scoliosis, with convexity to the left. He has no prior history of trauma os lower back surgery. Pain does not radiated to lower extremities and is localized in the thoracolumbar region. 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 He has previously been prescribed Norco without

## 2025-02-10 NOTE — PROGRESS NOTES
[place]  Internal Medicine Residency Clinic    Attending Physician Statement  I have discussed the case, including pertinent history and exam findings with the resident physician.I have seen and examined the patient and the key elements of the encounter have been performed by me. I agree with the assessment, plan and orders as documented by the resident. I have reviewed the relevant PMHx, PSHx, FamHx, SocialHx, medications, and allergies and updated history as appropriate.    Patient presents for routine follow up of medical problems.    Acute on chronic lumbar pain   Reports fall on ice 3 weeks ago   Hx of L1-2 fusion   Minimal help with ibuprofen / lidocaine   Recommend repeat lumbar xray   Ok for 2 week course muscle relaxer BID prn    Referred to pain management:  [phone]     Vit D def -- recommend 50000 x8 weeks    Hx GERD -- continues on protonix    Remainder of medical problems as per resident note.    Jose Krause,   2/10/2025 1:44 PM

## 2025-02-10 NOTE — PATIENT INSTRUCTIONS
Dear Angel English,    Thank you for coming to your appointment today. I hope we have addressed all of your needs.     Please make sure to do the following:  - Continue your medications as listed.  - Get labs drawn before our next follow up.  - Referrals have been made to Pain management :  If you do not hear from the office in 1 week, please call the number listed.  - We will see each other again in 2 months    Call for a sooner appointment if you develop any new or worsening symptoms.    Have a great day!    Sincerely,  Clare Bullock MD  2/10/2025  2:05 PM

## 2025-06-05 ENCOUNTER — OFFICE VISIT (OUTPATIENT)
Age: 40
End: 2025-06-05

## 2025-06-05 VITALS
HEART RATE: 64 BPM | TEMPERATURE: 98.1 F | WEIGHT: 121.4 LBS | HEIGHT: 62 IN | SYSTOLIC BLOOD PRESSURE: 105 MMHG | RESPIRATION RATE: 18 BRPM | DIASTOLIC BLOOD PRESSURE: 69 MMHG | OXYGEN SATURATION: 99 % | BODY MASS INDEX: 22.34 KG/M2

## 2025-06-05 DIAGNOSIS — K21.9 GASTROESOPHAGEAL REFLUX DISEASE WITHOUT ESOPHAGITIS: ICD-10-CM

## 2025-06-05 DIAGNOSIS — Q87.2 VATER SYNDROME: ICD-10-CM

## 2025-06-05 DIAGNOSIS — E55.9 VITAMIN D DEFICIENCY: ICD-10-CM

## 2025-06-05 PROCEDURE — 99212 OFFICE O/P EST SF 10 MIN: CPT

## 2025-06-05 PROCEDURE — 99213 OFFICE O/P EST LOW 20 MIN: CPT

## 2025-06-05 RX ORDER — FOLIC ACID 1 MG/1
1 TABLET ORAL DAILY
Qty: 90 TABLET | Refills: 1 | Status: SHIPPED | OUTPATIENT
Start: 2025-06-05

## 2025-06-05 RX ORDER — PANTOPRAZOLE SODIUM 40 MG/1
40 TABLET, DELAYED RELEASE ORAL
Qty: 90 TABLET | Refills: 1 | Status: SHIPPED | OUTPATIENT
Start: 2025-06-05

## 2025-06-05 RX ORDER — CHOLECALCIFEROL (VITAMIN D3) 25 MCG
1000 TABLET ORAL DAILY
Qty: 30 TABLET | Refills: 3 | Status: SHIPPED | OUTPATIENT
Start: 2025-06-05

## 2025-06-05 SDOH — ECONOMIC STABILITY: FOOD INSECURITY: WITHIN THE PAST 12 MONTHS, THE FOOD YOU BOUGHT JUST DIDN'T LAST AND YOU DIDN'T HAVE MONEY TO GET MORE.: NEVER TRUE

## 2025-06-05 SDOH — ECONOMIC STABILITY: FOOD INSECURITY: WITHIN THE PAST 12 MONTHS, YOU WORRIED THAT YOUR FOOD WOULD RUN OUT BEFORE YOU GOT MONEY TO BUY MORE.: NEVER TRUE

## 2025-06-05 ASSESSMENT — PATIENT HEALTH QUESTIONNAIRE - PHQ9
SUM OF ALL RESPONSES TO PHQ QUESTIONS 1-9: 0
2. FEELING DOWN, DEPRESSED OR HOPELESS: NOT AT ALL
SUM OF ALL RESPONSES TO PHQ QUESTIONS 1-9: 0
1. LITTLE INTEREST OR PLEASURE IN DOING THINGS: NOT AT ALL
SUM OF ALL RESPONSES TO PHQ QUESTIONS 1-9: 0
SUM OF ALL RESPONSES TO PHQ QUESTIONS 1-9: 0

## 2025-06-05 ASSESSMENT — ENCOUNTER SYMPTOMS
EYE DISCHARGE: 0
CHEST TIGHTNESS: 0
ANAL BLEEDING: 0
EYE PAIN: 0
CHOKING: 0
ABDOMINAL PAIN: 0
BLOOD IN STOOL: 0
FACIAL SWELLING: 0
NAUSEA: 0
BACK PAIN: 0
ABDOMINAL DISTENTION: 0
EYE ITCHING: 0
CONSTIPATION: 0
COUGH: 0
APNEA: 0
DIARRHEA: 0
EYE REDNESS: 0
PHOTOPHOBIA: 0

## 2025-06-05 NOTE — PATIENT INSTRUCTIONS
Estimado/a Angel English,    Janell por asistir a funes gregorio en [place] [facility] Res[facility].    Por favor, asegúrese de hacer lo siguiente:  Continúe con los medicamentos según lo indicado y comuníquese con nuestra oficina si los medicamentos no están disponibles en la farmacia.  Complete cualquier análisis de laboratorio solicitado según las instrucciones.  Si se le refirió a la oficina de otro médico, recibirá nany llamada para agendar nany gregorio. Por favor comuníquese con nuestra oficina en 5 días hábiles si no ha recibido nany llamada de amarilis oficina.  Si se solicitó alguna prueba de imágenes en la gregorio de [facility] (ultrasonido, tomografía computarizada o resonancia magnética), espere nany llamada telefónica para agendarla en los próximos 5 días hábiles. También puede llamar usted mismo/a al departamento de programación de imágenes de [facility] al [phone] para programarla.    Comuníquese con nuestra oficina si desarrolla nuevos síntomas o si los síntomas empeoran, o si tiene alguna pregunta sobre la visita de [facility].    ¡Nuestro objetivo es atender isis necesidades de morgan de manera satisfactoria!

## 2025-06-05 NOTE — PROGRESS NOTES
[facility]  Internal Medicine Residency Clinic    Attending Physician Statement  I have discussed the case, including pertinent history and exam findings with the resident physician. I agree with the assessment, plan and orders as documented by the resident. I have reviewed the relevant PMHx, PSHx, FamHx, SocialHx, medications, and allergies and updated history as appropriate.    Patient presents for routine follow up of medical problems.   Case discussed with PGY 1 in detail  40-year-old man who is employed at [employer]  His work involves lifting  Has a history of kyphosis  Currently asymptomatic  Consider referral to sports medicine if indeed he becomes symptomatic  Wellness exam to be completed at next visit  Vitamin D replacement as recommended  Remainder of medical problems as per resident note.        Edil Aponte MD  6/5/2025 2:11 PM    
Pain 15 tablet 0    vitamin D (ERGOCALCIFEROL) 1.25 MG (51707 UT) CAPS capsule Take 1 capsule by mouth once a week for 8 doses (Patient not taking: Reported on 6/5/2025) 8 capsule 0     No current facility-administered medications on file prior to visit.       OBJECTIVE:    VS:   Vitals:    06/05/25 1351   BP: 105/69   BP Site: Right Upper Arm   Patient Position: Sitting   BP Cuff Size: Medium Adult   Pulse: 64   Resp: 18   Temp: 98.1 °F (36.7 °C)   TempSrc: Temporal   SpO2: 99%   Weight: 55.1 kg (121 lb 6.4 oz)   Height: 1.575 m (5' 2\")     Physical Exam  Constitutional:       Appearance: Normal appearance.   HENT:      Head: Normocephalic and atraumatic.      Right Ear: Tympanic membrane normal.      Left Ear: Tympanic membrane normal.      Nose: Nose normal.   Eyes:      Extraocular Movements: Extraocular movements intact.      Conjunctiva/sclera: Conjunctivae normal.      Pupils: Pupils are equal, round, and reactive to light.   Cardiovascular:      Rate and Rhythm: Normal rate and regular rhythm.      Heart sounds: Normal heart sounds.   Pulmonary:      Breath sounds: Normal breath sounds.   Abdominal:      General: Bowel sounds are normal.   Musculoskeletal:         General: Normal range of motion.      Cervical back: Normal range of motion.   Skin:     General: Skin is warm.      Capillary Refill: Capillary refill takes less than 2 seconds.   Neurological:      General: No focal deficit present.      Mental Status: He is alert and oriented to person, place, and time.   Psychiatric:         Mood and Affect: Mood normal.         Behavior: Behavior normal.            ASSESSMENT/PLAN:  1. VATER syndrome  -     folic acid (FOLVITE) 1 MG tablet; Take 1 tablet by mouth daily, Disp-90 tablet, R-1Normal  2. Gastroesophageal reflux disease without esophagitis  -     pantoprazole (PROTONIX) 40 MG tablet; Take 1 tablet by mouth every morning (before breakfast), Disp-90 tablet, R-1Normal  3. Vitamin D deficiency  -

## (undated) DEVICE — BANDAGE COMPR W4INXL10YD WHITE/BEIGE E MTRX HK LOOP CLSR

## (undated) DEVICE — INTENDED FOR TISSUE SEPARATION, AND OTHER PROCEDURES THAT REQUIRE A SHARP SURGICAL BLADE TO PUNCTURE OR CUT.: Brand: BARD-PARKER ® STAINLESS STEEL BLADES

## (undated) DEVICE — DRESSING,GAUZE,XEROFORM,CURAD,5"X9",ST: Brand: CURAD

## (undated) DEVICE — GLOVE ORANGE PI 8   MSG9080

## (undated) DEVICE — PADDING,UNDERCAST,COTTON, 4"X4YD STERILE: Brand: MEDLINE

## (undated) DEVICE — DRILL SYSTEM 7

## (undated) DEVICE — GLOVE ORTHO 8   MSG9480

## (undated) DEVICE — PATIENT RETURN ELECTRODE, SINGLE-USE, CONTACT QUALITY MONITORING, ADULT, WITH 9FT CORD, FOR PATIENTS WEIGING OVER 33LBS. (15KG): Brand: MEGADYNE

## (undated) DEVICE — SET ORTHO STD STORTSTD2

## (undated) DEVICE — SCREW BNE L44MM DIA5MM COR DIA4.3MM TIB LT GRN TI ALLY ST
Type: IMPLANTABLE DEVICE | Site: TIBIA | Status: NON-FUNCTIONAL
Removed: 2021-06-01

## (undated) DEVICE — SURGICAL PROCEDURE PACK BASIC

## (undated) DEVICE — GUIDEWIRE ORTH L150MM DIA1.25MM S STL THRD FOR 4MM CANN SCR

## (undated) DEVICE — SHEET,DRAPE,53X77,STERILE: Brand: MEDLINE

## (undated) DEVICE — BIT DRL L160MM DIA2.7MM ST CANN QUIK CPL NONRADIOLUCENT ADJ

## (undated) DEVICE — BIT DRL L330MM DIA4.2MM CALIB L100MM ST 3 FLUT QUIK CPL FOR

## (undated) DEVICE — GUIDEWIRE ORTH L400MM DIA3.2MM FOR TFN

## (undated) DEVICE — BIT DRL L145MM DIA4.2MM ST 3 FLUT NDL PNT QUIK CPL FOR FEM

## (undated) DEVICE — PADDING CAST W6INXL4YD COT LO LINTING WYTEX

## (undated) DEVICE — TUBING SUCT 12FR MAL ALUM SHFT FN CAP VENT UNIV CONN W/ OBT

## (undated) DEVICE — ROD RMR L950MM DIA2.5MM W/ EXTN BALL TIP

## (undated) DEVICE — 3M™ IOBAN™ 2 ANTIMICROBIAL INCISE DRAPE 6650EZ: Brand: IOBAN™ 2

## (undated) DEVICE — DRAPE EQUIP CARM 72X42 IN RUBBER BND CLP

## (undated) DEVICE — Z INACTIVE USE 2660664 SOLUTION IRRIG 3000ML 0.9% SOD CHL USP UROMATIC PLAS CONT

## (undated) DEVICE — TOTAL KNEE PK

## (undated) DEVICE — 3M™ STERI-DRAPE™ U-DRAPE 1015: Brand: STERI-DRAPE™

## (undated) DEVICE — 1000 S-DRAPE TOWEL DRAPE 10/BX: Brand: STERI-DRAPE™

## (undated) DEVICE — GOWN,BREATHABLE SLV,AURORA,XLG,STRL: Brand: MEDLINE

## (undated) DEVICE — Device

## (undated) DEVICE — BLADE CLIPPER GEN PURP NS

## (undated) DEVICE — DRIP REDUCTION MANIFOLD

## (undated) DEVICE — ROD RM L1150MM DIA2.5MM TI W/ EXTN BALL TIP FOR IM NAIL

## (undated) DEVICE — ZIMMER® STERILE DISPOSABLE TOURNIQUET CUFF WITH PROTECTIVE SLEEVE AND PLC, DUAL PORT, SINGLE BLADDER, 34 IN. (86 CM)

## (undated) DEVICE — SET ORTHO STD STORTSTD1

## (undated) DEVICE — APPLICATOR MEDICATED 26 CC SOLUTION HI LT ORNG CHLORAPREP

## (undated) DEVICE — CLOTH SURG PREP PREOPERATIVE CHLORHEXIDINE GLUC 2% READYPREP

## (undated) DEVICE — SLEEVE SURG DIA12MM STR OUTER PROTCT DISPOSABLE FOR 8 11MM